# Patient Record
Sex: MALE | Race: WHITE | NOT HISPANIC OR LATINO | Employment: FULL TIME | URBAN - METROPOLITAN AREA
[De-identification: names, ages, dates, MRNs, and addresses within clinical notes are randomized per-mention and may not be internally consistent; named-entity substitution may affect disease eponyms.]

---

## 2017-03-28 DIAGNOSIS — Z01.812 PRE-PROCEDURAL LABORATORY EXAMINATION: ICD-10-CM

## 2017-03-28 LAB
ANION GAP SERPL CALC-SCNC: 10 MMOL/L (ref 0–11.9)
BUN SERPL-MCNC: 16 MG/DL (ref 8–22)
CALCIUM SERPL-MCNC: 9 MG/DL (ref 8.5–10.5)
CHLORIDE SERPL-SCNC: 105 MMOL/L (ref 96–112)
CO2 SERPL-SCNC: 26 MMOL/L (ref 20–33)
CREAT SERPL-MCNC: 1.05 MG/DL (ref 0.5–1.4)
ERYTHROCYTE [DISTWIDTH] IN BLOOD BY AUTOMATED COUNT: 38 FL (ref 35.9–50)
GFR SERPL CREATININE-BSD FRML MDRD: >60 ML/MIN/1.73 M 2
GLUCOSE SERPL-MCNC: 110 MG/DL (ref 65–99)
HCT VFR BLD AUTO: 45.3 % (ref 42–52)
HGB BLD-MCNC: 15.8 G/DL (ref 14–18)
MCH RBC QN AUTO: 31 PG (ref 27–33)
MCHC RBC AUTO-ENTMCNC: 34.9 G/DL (ref 33.7–35.3)
MCV RBC AUTO: 89 FL (ref 81.4–97.8)
PLATELET # BLD AUTO: 216 K/UL (ref 164–446)
PMV BLD AUTO: 9.2 FL (ref 9–12.9)
POTASSIUM SERPL-SCNC: 4.1 MMOL/L (ref 3.6–5.5)
RBC # BLD AUTO: 5.09 M/UL (ref 4.7–6.1)
SODIUM SERPL-SCNC: 141 MMOL/L (ref 135–145)
WBC # BLD AUTO: 4.9 K/UL (ref 4.8–10.8)

## 2017-03-28 PROCEDURE — 36415 COLL VENOUS BLD VENIPUNCTURE: CPT

## 2017-03-28 PROCEDURE — 80048 BASIC METABOLIC PNL TOTAL CA: CPT

## 2017-03-28 PROCEDURE — 85027 COMPLETE CBC AUTOMATED: CPT

## 2017-03-28 RX ORDER — IBUPROFEN 200 MG
200 TABLET ORAL PRN
COMMUNITY
End: 2018-05-02

## 2017-03-30 ENCOUNTER — APPOINTMENT (OUTPATIENT)
Dept: ADMISSIONS | Facility: MEDICAL CENTER | Age: 47
End: 2017-03-30
Payer: COMMERCIAL

## 2017-04-14 ENCOUNTER — HOSPITAL ENCOUNTER (OUTPATIENT)
Facility: MEDICAL CENTER | Age: 47
End: 2017-04-14
Attending: OTOLARYNGOLOGY | Admitting: OTOLARYNGOLOGY
Payer: COMMERCIAL

## 2017-04-14 VITALS
HEIGHT: 76 IN | RESPIRATION RATE: 16 BRPM | TEMPERATURE: 98.3 F | BODY MASS INDEX: 27.06 KG/M2 | HEART RATE: 88 BPM | OXYGEN SATURATION: 94 % | DIASTOLIC BLOOD PRESSURE: 105 MMHG | SYSTOLIC BLOOD PRESSURE: 177 MMHG | WEIGHT: 222.22 LBS

## 2017-04-14 PROBLEM — K13.79 ELONGATED UVULA, ACQUIRED: Status: ACTIVE | Noted: 2017-04-14

## 2017-04-14 PROBLEM — J35.1 HYPERTROPHY OF TONSILS: Status: ACTIVE | Noted: 2017-04-14

## 2017-04-14 PROCEDURE — 700102 HCHG RX REV CODE 250 W/ 637 OVERRIDE(OP)

## 2017-04-14 PROCEDURE — 502573 HCHG PACK, ENT: Performed by: OTOLARYNGOLOGY

## 2017-04-14 PROCEDURE — 500122 HCHG BOVIE, BLADE: Performed by: OTOLARYNGOLOGY

## 2017-04-14 PROCEDURE — 700101 HCHG RX REV CODE 250

## 2017-04-14 PROCEDURE — 160048 HCHG OR STATISTICAL LEVEL 1-5: Performed by: OTOLARYNGOLOGY

## 2017-04-14 PROCEDURE — 502626 HCHG SURGIFLO HEMOSTATIC MATRIX 6ML: Performed by: OTOLARYNGOLOGY

## 2017-04-14 PROCEDURE — 160036 HCHG PACU - EA ADDL 30 MINS PHASE I: Performed by: OTOLARYNGOLOGY

## 2017-04-14 PROCEDURE — 502240 HCHG MISC OR SUPPLY RC 0272: Performed by: OTOLARYNGOLOGY

## 2017-04-14 PROCEDURE — 110371 HCHG SHELL REV 272: Performed by: OTOLARYNGOLOGY

## 2017-04-14 PROCEDURE — 88304 TISSUE EXAM BY PATHOLOGIST: CPT

## 2017-04-14 PROCEDURE — 160009 HCHG ANES TIME/MIN: Performed by: OTOLARYNGOLOGY

## 2017-04-14 PROCEDURE — A4606 OXYGEN PROBE USED W OXIMETER: HCPCS | Performed by: OTOLARYNGOLOGY

## 2017-04-14 PROCEDURE — 500331 HCHG COTTONOID, SURG PATTIE: Performed by: OTOLARYNGOLOGY

## 2017-04-14 PROCEDURE — 160029 HCHG SURGERY MINUTES - 1ST 30 MINS LEVEL 4: Performed by: OTOLARYNGOLOGY

## 2017-04-14 PROCEDURE — 160002 HCHG RECOVERY MINUTES (STAT): Performed by: OTOLARYNGOLOGY

## 2017-04-14 PROCEDURE — 500123 HCHG BOVIE, CONTROL W/BLADE: Performed by: OTOLARYNGOLOGY

## 2017-04-14 PROCEDURE — 160035 HCHG PACU - 1ST 60 MINS PHASE I: Performed by: OTOLARYNGOLOGY

## 2017-04-14 PROCEDURE — 700111 HCHG RX REV CODE 636 W/ 250 OVERRIDE (IP)

## 2017-04-14 PROCEDURE — 500125 HCHG BOVIE, HANDLE: Performed by: OTOLARYNGOLOGY

## 2017-04-14 PROCEDURE — 110382 HCHG SHELL REV 271: Performed by: OTOLARYNGOLOGY

## 2017-04-14 PROCEDURE — 160041 HCHG SURGERY MINUTES - EA ADDL 1 MIN LEVEL 4: Performed by: OTOLARYNGOLOGY

## 2017-04-14 PROCEDURE — 500257: Performed by: OTOLARYNGOLOGY

## 2017-04-14 PROCEDURE — A9270 NON-COVERED ITEM OR SERVICE: HCPCS

## 2017-04-14 PROCEDURE — 501838 HCHG SUTURE GENERAL: Performed by: OTOLARYNGOLOGY

## 2017-04-14 RX ORDER — LABETALOL HYDROCHLORIDE 5 MG/ML
10 INJECTION, SOLUTION INTRAVENOUS PRN
Status: DISCONTINUED | OUTPATIENT
Start: 2017-04-14 | End: 2017-04-14 | Stop reason: HOSPADM

## 2017-04-14 RX ORDER — MORPHINE SULFATE 10 MG/ML
INJECTION, SOLUTION INTRAMUSCULAR; INTRAVENOUS
Status: COMPLETED
Start: 2017-04-14 | End: 2017-04-14

## 2017-04-14 RX ORDER — BACITRACIN ZINC 500 [USP'U]/G
OINTMENT TOPICAL
Status: DISCONTINUED
Start: 2017-04-14 | End: 2017-04-14 | Stop reason: HOSPADM

## 2017-04-14 RX ORDER — OXYMETAZOLINE HYDROCHLORIDE 0.05 G/100ML
SPRAY NASAL
Status: DISCONTINUED
Start: 2017-04-14 | End: 2017-04-14 | Stop reason: HOSPADM

## 2017-04-14 RX ORDER — BACITRACIN ZINC 500 [USP'U]/G
OINTMENT TOPICAL
Status: DISCONTINUED | OUTPATIENT
Start: 2017-04-14 | End: 2017-04-14 | Stop reason: HOSPADM

## 2017-04-14 RX ORDER — HYDRALAZINE HYDROCHLORIDE 20 MG/ML
INJECTION INTRAMUSCULAR; INTRAVENOUS
Status: COMPLETED
Start: 2017-04-14 | End: 2017-04-14

## 2017-04-14 RX ORDER — LIDOCAINE HYDROCHLORIDE AND EPINEPHRINE 10; 10 MG/ML; UG/ML
INJECTION, SOLUTION INFILTRATION; PERINEURAL
Status: DISCONTINUED | OUTPATIENT
Start: 2017-04-14 | End: 2017-04-14 | Stop reason: HOSPADM

## 2017-04-14 RX ORDER — SODIUM CHLORIDE, SODIUM LACTATE, POTASSIUM CHLORIDE, CALCIUM CHLORIDE 600; 310; 30; 20 MG/100ML; MG/100ML; MG/100ML; MG/100ML
INJECTION, SOLUTION INTRAVENOUS CONTINUOUS
Status: DISCONTINUED | OUTPATIENT
Start: 2017-04-14 | End: 2017-04-14 | Stop reason: HOSPADM

## 2017-04-14 RX ORDER — SODIUM CHLORIDE, SODIUM LACTATE, POTASSIUM CHLORIDE, CALCIUM CHLORIDE 600; 310; 30; 20 MG/100ML; MG/100ML; MG/100ML; MG/100ML
1000 INJECTION, SOLUTION INTRAVENOUS
Status: COMPLETED | OUTPATIENT
Start: 2017-04-14 | End: 2017-04-14

## 2017-04-14 RX ORDER — LIDOCAINE HYDROCHLORIDE AND EPINEPHRINE 10; 10 MG/ML; UG/ML
INJECTION, SOLUTION INFILTRATION; PERINEURAL
Status: DISCONTINUED
Start: 2017-04-14 | End: 2017-04-14 | Stop reason: HOSPADM

## 2017-04-14 RX ORDER — ONDANSETRON 8 MG/1
8 TABLET, ORALLY DISINTEGRATING ORAL EVERY 8 HOURS PRN
Qty: 10 TAB | Refills: 1 | Status: SHIPPED | OUTPATIENT
Start: 2017-04-14 | End: 2018-05-02

## 2017-04-14 RX ORDER — HALOPERIDOL 5 MG/ML
INJECTION INTRAMUSCULAR
Status: COMPLETED
Start: 2017-04-14 | End: 2017-04-14

## 2017-04-14 RX ORDER — OXYMETAZOLINE HYDROCHLORIDE 0.05 G/100ML
SPRAY NASAL
Status: COMPLETED
Start: 2017-04-14 | End: 2017-04-14

## 2017-04-14 RX ORDER — OXYCODONE HCL 5 MG/5 ML
SOLUTION, ORAL ORAL
Status: COMPLETED
Start: 2017-04-14 | End: 2017-04-14

## 2017-04-14 RX ORDER — AMOXICILLIN 250 MG/5ML
500 POWDER, FOR SUSPENSION ORAL 2 TIMES DAILY
Qty: 200 ML | Refills: 0 | Status: SHIPPED | OUTPATIENT
Start: 2017-04-14 | End: 2018-05-02

## 2017-04-14 RX ORDER — OXYMETAZOLINE HYDROCHLORIDE 0.05 G/100ML
SPRAY NASAL
Status: DISCONTINUED | OUTPATIENT
Start: 2017-04-14 | End: 2017-04-14 | Stop reason: HOSPADM

## 2017-04-14 RX ORDER — OXYCODONE HYDROCHLORIDE AND ACETAMINOPHEN 5; 325 MG/1; MG/1
1-2 TABLET ORAL EVERY 4 HOURS PRN
Qty: 80 TAB | Refills: 0 | Status: SHIPPED | OUTPATIENT
Start: 2017-04-14 | End: 2018-05-02

## 2017-04-14 RX ADMIN — OXYCODONE HYDROCHLORIDE 10 MG: 5 SOLUTION ORAL at 13:57

## 2017-04-14 RX ADMIN — MORPHINE SULFATE 2 MG: 10 INJECTION INTRAVENOUS at 15:15

## 2017-04-14 RX ADMIN — MORPHINE SULFATE 2 MG: 10 INJECTION INTRAVENOUS at 14:44

## 2017-04-14 RX ADMIN — FENTANYL CITRATE 25 MCG: 50 INJECTION, SOLUTION INTRAMUSCULAR; INTRAVENOUS at 14:13

## 2017-04-14 RX ADMIN — FENTANYL CITRATE 25 MCG: 50 INJECTION, SOLUTION INTRAMUSCULAR; INTRAVENOUS at 13:57

## 2017-04-14 RX ADMIN — HYDRALAZINE HYDROCHLORIDE 10 MG: 20 INJECTION INTRAMUSCULAR; INTRAVENOUS at 16:50

## 2017-04-14 RX ADMIN — MORPHINE SULFATE 2 MG: 10 INJECTION INTRAVENOUS at 14:55

## 2017-04-14 RX ADMIN — HALOPERIDOL LACTATE 1 MG: 5 INJECTION, SOLUTION INTRAMUSCULAR at 17:05

## 2017-04-14 RX ADMIN — SODIUM CHLORIDE, SODIUM LACTATE, POTASSIUM CHLORIDE, CALCIUM CHLORIDE 1000 ML: 600; 310; 30; 20 INJECTION, SOLUTION INTRAVENOUS at 11:00

## 2017-04-14 RX ADMIN — OXYMETAZOLINE HYDROCHLORIDE 2 SPRAY: 5 SPRAY NASAL at 12:15

## 2017-04-14 RX ADMIN — LABETALOL HYDROCHLORIDE 10 MG: 5 INJECTION, SOLUTION INTRAVENOUS at 15:00

## 2017-04-14 RX ADMIN — MORPHINE SULFATE 2 MG: 10 INJECTION INTRAVENOUS at 14:28

## 2017-04-14 RX ADMIN — FENTANYL CITRATE 50 MCG: 50 INJECTION, SOLUTION INTRAMUSCULAR; INTRAVENOUS at 14:04

## 2017-04-14 RX ADMIN — LABETALOL HYDROCHLORIDE 10 MG: 5 INJECTION, SOLUTION INTRAVENOUS at 14:50

## 2017-04-14 ASSESSMENT — PAIN SCALES - GENERAL
PAINLEVEL_OUTOF10: 7
PAINLEVEL_OUTOF10: 6
PAINLEVEL_OUTOF10: 4
PAINLEVEL_OUTOF10: 4
PAINLEVEL_OUTOF10: 6
PAINLEVEL_OUTOF10: 4
PAINLEVEL_OUTOF10: ASSUMED PAIN PRESENT
PAINLEVEL_OUTOF10: 4
PAINLEVEL_OUTOF10: 7
PAINLEVEL_OUTOF10: 4
PAINLEVEL_OUTOF10: 6
PAINLEVEL_OUTOF10: 4

## 2017-04-14 NOTE — IP AVS SNAPSHOT
4/14/2017    Aleksey Chavez  [address] [place] [address]    Dear Aleksey:    [facility] wants to ensure your discharge home is safe and you or your loved ones have had all of your questions answered regarding your care after you leave the hospital.    Below is a list of resources and contact information should you have any questions regarding your hospital stay, follow-up instructions, or active medical symptoms.    Questions or Concerns Regarding… Contact   Medical Questions Related to Your Discharge  (7 days a week, 8am-5pm) Contact a Nurse Care Coordinator   [phone]   Medical Questions Not Related to Your Discharge  (24 hours a day / 7 days a week)  Contact the Nurse Health Line   [phone]    Medications or Discharge Instructions Refer to your discharge packet   or contact your [facility] Primary Care Provider   [phone]   Follow-up Appointment(s) Schedule your appointment via [employer]   or contact Scheduling [phone]   Billing Review your statement via [employer]  or contact Billing [phone]   Medical Records Review your records via [employer]   or contact Medical Records [phone]     You may receive a telephone call within two days of discharge. This call is to make certain you understand your discharge instructions and have the opportunity to have any questions answered. You can also easily access your medical information, test results and upcoming appointments via the [employer] free online health management tool. You can learn more and sign up at [employer]Sync[employer]ak. For assistance setting up your S[employer]bak account, please call [phone].    Once again, we want to ensure your discharge home is safe and that you have a clear understanding of any next steps in your care. If you have any questions or concerns, please do not hesitate to contact us, we are here for you. Thank you for choosing [facility] for your healthcare needs.    Sincerely,    Your [facility] Healthcare Team

## 2017-04-14 NOTE — OR NURSING
1440 resumed care, cont to c/o pain, meds given, unable to reach  for ETD  1600  present  1700 c/o nausea, meds given,   1745 drip pad chgd x 3 for small amt fresh bleeding, pt expressing readiness to go home & that he has had very good care today, instructions given, iv d/c'd, home via w/c

## 2017-04-14 NOTE — OR SURGEON
Immediate Post-Operative Note      PreOp Diagnosis: septal deviation, bilateral inferior turbinate hypertrophy,tonsillitis, uvular edema    PostOp Diagnosis: same    Procedure(s):  SEPTOPLASTY - Wound Class: Clean Contaminated  TURBINOPLASTY - Wound Class: Clean Contaminated  UVULOPHARYNGOPALATOPLASTY FOR: UVULECTOMY   - Wound Class: Clean Contaminated  TONSILLECTOMY - Wound Class: Clean Contaminated    Surgeon(s):  Chaparro Ji M.D.    Anesthesiologist/Type of Anesthesia:  Anesthesiologist: Segundo Dougherty M.D./General    Surgical Staff:  Circulator: Stephanie Cavanaugh R.N.  Scrub Person: Laurie Perales    Specimen: tonsils    Estimated Blood Loss: 30 ml    Findings: 4+ tonsils    Complications: none        4/14/2017 2:10 PM Chaparro Ji

## 2017-04-14 NOTE — DISCHARGE INSTRUCTIONS
ACTIVITY: Rest and take it easy for the first 24 hours.  A responsible adult is recommended to remain with you during that time.  It is normal to feel sleepy.  We encourage you to not do anything that requires balance, judgment or coordination.    MILD FLU-LIKE SYMPTOMS ARE NORMAL. YOU MAY EXPERIENCE GENERALIZED MUSCLE ACHES, THROAT IRRITATION, HEADACHE AND/OR SOME NAUSEA.    FOR 24 HOURS DO NOT:  Drive, operate machinery or run household appliances.  Drink beer or alcoholic beverages.   Make important decisions or sign legal documents.    SPECIAL INSTRUCTIONS: *SEE POST OPERATIVE INSTRUCTION SHEET, SLEEP WITH HEAD ELEVATED, KEEP HYDRATED, NO NOSE BLOWING, HEAVY LIFTING, BENDING OR STRAINING, NO CITRUS PRODUCTS (LEMON, LIME, ORANGE, TOMATO, OR GRAPEFRUIT) **    DIET: To avoid nausea, slowly advance diet as tolerated, avoiding spicy or greasy foods for the first day.  Add more substantial food to your diet according to your physician's instructions.  Babies can be fed formula or breast milk as soon as they are hungry.  INCREASE FLUIDS AND FIBER TO AVOID CONSTIPATION.    SURGICAL DRESSING/BATHING: *CHANGE NASAL DRIP PAD AS NEEDED, MAY SHOWER TOMORROW, NO HOT SHOWERS, HOT TUBS, HOT BATHS OR SWIMMING FOR 2 WEEKS**    FOLLOW-UP APPOINTMENT:  A follow-up appointment should be arranged with your doctor in ; call to schedule.    You should CALL YOUR PHYSICIAN if you develop:  Fever greater than 101 degrees F.  Pain not relieved by medication, or persistent nausea or vomiting.  Excessive bleeding (blood soaking through dressing) or unexpected drainage from the wound.  Extreme redness or swelling around the incision site, drainage of pus or foul smelling drainage.  Inability to urinate or empty your bladder within 8 hours.  Problems with breathing or chest pain.    You should call 911 if you develop problems with breathing or chest pain.  If you are unable to contact your doctor or surgical center, you should go to the  nearest emergency room or urgent care center.  Physician's telephone #: *[phone]**    If any questions arise, call your doctor.  If your doctor is not available, please feel free to call the Surgical Center at [phone].  The Center is open Monday through Friday from 7AM to 7PM.  You can also call the HEALTH HOTLINE open 24 hours/day, 7 days/week and speak to a nurse at [phone], or toll free at [phone].    A registered nurse may call you a few days after your surgery to see how you are doing after your procedure.    MEDICATIONS: Resume taking daily medication.  Take prescribed pain medication with food.  If no medication is prescribed, you may take non-aspirin pain medication if needed.  PAIN MEDICATION CAN BE VERY CONSTIPATING.  Take a stool softener or laxative such as senokot, pericolace, or milk of magnesia if needed.    Prescription given for *PERCOCET, ZOFRAN, & AMOXICILLIN**.  Last pain medication given at *2:00PM, NEXT DUE AT 6:00PM**.    If your physician has prescribed pain medication that includes Acetaminophen (Tylenol), do not take additional Acetaminophen (Tylenol) while taking the prescribed medication.    Depression / Suicide Risk    As you are discharged from this [facility] Health facility, it is important to learn how to keep safe from harming yourself.    Recognize the warning signs:  · Abrupt changes in personality, positive or negative- including increase in energy   · Giving away possessions  · Change in eating patterns- significant weight changes-  positive or negative  · Change in sleeping patterns- unable to sleep or sleeping all the time   · Unwillingness or inability to communicate  · Depression  · Unusual sadness, discouragement and loneliness  · Talk of wanting to die  · Neglect of personal appearance   · Rebelliousness- reckless behavior  · Withdrawal from people/activities they love  · Confusion- inability to concentrate     If you or a loved one observes any of  these behaviors or has concerns about self-harm, here's what you can do:  · Talk about it- your feelings and reasons for harming yourself  · Remove any means that you might use to hurt yourself (examples: pills, rope, extension cords, firearm)  · Get professional help from the community (Mental Health, Substance Abuse, psychological counseling)  · Do not be alone:Call your Safe Contact- someone whom you trust who will be there for you.  · Call your local CRISIS HOTLINE 212-4317 or 926-721-5239  · Call your local Children's Mobile Crisis Response Team [place] (747) 888-8907 or www.[employer]  · Call the toll free National Suicide Prevention Hotlines   · National Suicide Prevention Lifeline 158-226-KDZB (1985)  · National Hope Line Network 800-SUICIDE (167-6992)

## 2017-04-14 NOTE — IP AVS SNAPSHOT
" Home Care Instructions                                                                                                                Name:Aleksey Chavez  Medical Record Number:[id number]  CSN: [id number]    YOB: 1970   Age: 46 y.o.  Sex: male  HT:1.93 m (6' 3.98\") WT: 100.8 kg (222 lb 3.6 oz)          Admit Date: 4/14/2017     Discharge Date:   Today's Date: 4/14/2017  Attending Doctor:  Chaparro Ji M.D.                  Allergies:  Review of patient's allergies indicates no known allergies.                Discharge Instructions         ACTIVITY: Rest and take it easy for the first 24 hours.  A responsible adult is recommended to remain with you during that time.  It is normal to feel sleepy.  We encourage you to not do anything that requires balance, judgment or coordination.    MILD FLU-LIKE SYMPTOMS ARE NORMAL. YOU MAY EXPERIENCE GENERALIZED MUSCLE ACHES, THROAT IRRITATION, HEADACHE AND/OR SOME NAUSEA.    FOR 24 HOURS DO NOT:  Drive, operate machinery or run household appliances.  Drink beer or alcoholic beverages.   Make important decisions or sign legal documents.    SPECIAL INSTRUCTIONS: *SEE POST OPERATIVE INSTRUCTION SHEET, SLEEP WITH HEAD ELEVATED, KEEP HYDRATED, NO NOSE BLOWING, HEAVY LIFTING, BENDING OR STRAINING, NO CITRUS PRODUCTS (LEMON, LIME, ORANGE, TOMATO, OR GRAPEFRUIT) **    DIET: To avoid nausea, slowly advance diet as tolerated, avoiding spicy or greasy foods for the first day.  Add more substantial food to your diet according to your physician's instructions.  Babies can be fed formula or breast milk as soon as they are hungry.  INCREASE FLUIDS AND FIBER TO AVOID CONSTIPATION.    SURGICAL DRESSING/BATHING: *CHANGE NASAL DRIP PAD AS NEEDED, MAY SHOWER TOMORROW, NO HOT SHOWERS, HOT TUBS, HOT BATHS OR SWIMMING FOR 2 WEEKS**    FOLLOW-UP APPOINTMENT:  A follow-up appointment should be arranged with your doctor in ; call to schedule.    You should CALL YOUR PHYSICIAN if you " develop:  Fever greater than 101 degrees F.  Pain not relieved by medication, or persistent nausea or vomiting.  Excessive bleeding (blood soaking through dressing) or unexpected drainage from the wound.  Extreme redness or swelling around the incision site, drainage of pus or foul smelling drainage.  Inability to urinate or empty your bladder within 8 hours.  Problems with breathing or chest pain.    You should call 911 if you develop problems with breathing or chest pain.  If you are unable to contact your doctor or surgical center, you should go to the nearest emergency room or urgent care center.  Physician's telephone #: *[phone]**    If any questions arise, call your doctor.  If your doctor is not available, please feel free to call the Surgical Center at [phone].  The Center is open Monday through Friday from 7AM to 7PM.  You can also call the [employer] HOTLINE open 24 hours/day, 7 days/week and speak to a nurse at [phone], or toll free at [phone].    A registered nurse may call you a few days after your surgery to see how you are doing after your procedure.    MEDICATIONS: Resume taking daily medication.  Take prescribed pain medication with food.  If no medication is prescribed, you may take non-aspirin pain medication if needed.  PAIN MEDICATION CAN BE VERY CONSTIPATING.  Take a stool softener or laxative such as senokot, pericolace, or milk of magnesia if needed.    Prescription given for *PERCOCET, ZOFRAN, & AMOXICILLIN**.  Last pain medication given at *2:00PM, NEXT DUE AT 6:00PM**.    If your physician has prescribed pain medication that includes Acetaminophen (Tylenol), do not take additional Acetaminophen (Tylenol) while taking the prescribed medication.    Depression / Suicide Risk    As you are discharged from this [facility] facility, it is important to learn how to keep safe from harming yourself.    Recognize the warning signs:  · Abrupt changes in personality, positive  or negative- including increase in energy   · Giving away possessions  · Change in eating patterns- significant weight changes-  positive or negative  · Change in sleeping patterns- unable to sleep or sleeping all the time   · Unwillingness or inability to communicate  · Depression  · Unusual sadness, discouragement and loneliness  · Talk of wanting to die  · Neglect of personal appearance   · Rebelliousness- reckless behavior  · Withdrawal from people/activities they love  · Confusion- inability to concentrate     If you or a loved one observes any of these behaviors or has concerns about self-harm, here's what you can do:  · Talk about it- your feelings and reasons for harming yourself  · Remove any means that you might use to hurt yourself (examples: pills, rope, extension cords, firearm)  · Get professional help from the community (Mental Health, Substance Abuse, psychological counseling)  · Do not be alone:Call your Safe Contact- someone whom you trust who will be there for you.  · Call your local CRISIS HOTLINE 043-4897 or 911-073-5183  · Call your local Children's Mobile Crisis Response Team [place] (195) 591-0285 or www[employer]  · Call the toll free National Suicide Prevention Hotlines   · National Suicide Prevention Lifeline 899-619-CQUM (2304)  · National Hope Line Network 800-SUICIDE (028-1621)       Medication List      START taking these medications        Instructions    Morning Afternoon Evening Bedtime    amoxicillin 250 MG/5ML Susr   Commonly known as:  AMOXIL        Take 10 mL by mouth 2 times a day.   Dose:  500 mg                        ondansetron 8 MG Tbdp   Commonly known as:  ZOFRAN ODT        Take 1 Tab by mouth every 8 hours as needed for Nausea/Vomiting.   Dose:  8 mg                        oxycodone-acetaminophen 5-325 MG Tabs   Commonly known as:  PERCOCET        Take 1-2 Tabs by mouth every four hours as needed.   Dose:  1-2 Tab                          ASK your doctor  about these medications        Instructions    Morning Afternoon Evening Bedtime    ibuprofen 200 MG Tabs   Commonly known as:  MOTRIN        Take 200 mg by mouth as needed.   Dose:  200 mg                             Where to Get Your Medications      You can get these medications from any pharmacy     Bring a paper prescription for each of these medications    - amoxicillin 250 MG/5ML Susr  - ondansetron 8 MG Tbdp  - oxycodone-acetaminophen 5-325 MG Tabs            Medication Information     Above and/or attached are the medications your physician expects you to take upon discharge. Review all of your home medications and newly ordered medications with your doctor and/or pharmacist. Follow medication instructions as directed by your doctor and/or pharmacist. Please keep your medication list with you and share with your physician. Update the information when medications are discontinued, doses are changed, or new medications (including over-the-counter products) are added; and carry medication information at all times in the event of emergency situations.        Resources     Quit Smoking / Tobacco Use:    I understand the use of any tobacco products increases my chance of suffering from future heart disease or stroke and could cause other illnesses which may shorten my life. Quitting the use of tobacco products is the single most important thing I can do to improve my health. For further information on smoking / tobacco cessation call a Toll Free Quit Line at 1-908.758.7456 (*National Cancer Vass) or 1-822.664.1775 (American Lung Association) or you can access the web based program at www.lungusa.org.    [place] Tobacco Users Help Line:  (129) 383-5473       Toll Free: 1-891.926.9315  Quit Tobacco Program [facility] (755)196-7642    Crisis Hotline:    [place] Crisis Hotline:  6-048-PJYHGUV or 1-968.293.9747    [place] Crisis Hotline:    1-379.300.8428 or 351-256-8391    Discharge Survey:    Thank you for choosing [facility]. We hope we did everything we could to make your hospital stay a pleasant one. You may be receiving a survey and we would appreciate your time and participation in answering the questions. Your input is very valuable to us in our efforts to improve our service to our patients and their families.            Signatures     My signature on this form indicates that:    1. I acknowledge receipt and understanding of these Home Care Instruction.  2. My questions regarding this information have been answered to my satisfaction.  3. I have formulated a plan with my discharge nurse to obtain my prescribed medications for home.    __________________________________      __________________________________                   Patient Signature                                 Guardian/Responsible Adult Signature      __________________________________                 __________       ________                       Nurse Signature                                               Date                 Time

## 2017-04-14 NOTE — OR NURSING
Rec'd pt from OR with Dr. Dougherty, no airway present, vss, no distress noted, breathing is even and unlabored, hob elevated, pt placed on humidified O2, nasal gauze/sling placed, minimal sanguinous fluid present,   [id number] reported off to Philomena CORTEZ

## 2017-04-14 NOTE — OR NURSING
1355: care resumed, pt c/o 7/10 pain in throat, given sips of water, tolerating well,   1400: pt medicated with iv fent and oral pain meds,   1405: pt given another dose of iv fent,   1413: another dose of iv fent given, pain remains unchanged,   1420: Pain starting to ease, still 6/10, pt given a popsicle, nasal dressing changed,   1428: pt medicated with iv morphine,   1440: hand-off to Eda CORTEZ.

## 2017-04-17 NOTE — OP REPORT
DATE OF SERVICE:  04/14/2017    PREOPERATIVE DIAGNOSES:  Nasal septal deviation, bilateral inferior turbinate   hypertrophy, persistent tonsillitis and uvular edema.    POSTOPERATIVE DIAGNOSES:  Nasal septal deviation, bilateral inferior turbinate   hypertrophy, persistent tonsillitis and uvular edema.    PROCEDURES PERFORMED:  Nasal septoplasty with bilateral submucous resection of   the inferior turbinates, tonsillectomy and uvulectomy.    SURGEON:  Chaparro Ji MD    ANESTHESIA:  General endotracheal.    INDICATIONS:  The patient is a very nice 46-year-old male with nasal   obstruction and tonsillitis with obstruction and uvular edema.  Correction is   indicated.    DESCRIPTION OF PROCEDURE:  The patient was placed on the operating room table   in supine position.  After adequate general endotracheal anesthesia was   administered, the right and left nasal septal mucosa and right and left   inferior turbinates were anesthetized with 1% lidocaine with 1:100,000   epinephrine using approximately 10 mL.  Afrin-soaked pledgets were placed in   the nose bilaterally and the face was draped in a clean fashion.  A   hemitransfixion incision was performed on the left with a #15 blade and a   submucoperichondrial and mucoperiosteal flap was raised with a caudal   elevator.  Anterior to the septal deflection, the septal cartilage was incised   and an opposite-sided submucoperichondrial and mucoperiosteal flap was raised   with a caudal elevator.  The deviated portion of nasal septal cartilage and   bone were removed with a combination of a Melissa forceps and a V-chisel.    Once the septum was reduced to the midline, the hemitransfixion incision was   closed with a 4-0 Vicryl in an interrupted fashion.  The right and left   inferior turbinates were trimmed in the submucous fashion using straight and   curved turbinate scissors under direct visualization with a 4 mm, 0-degree   Storz endoscope.  Hemostasis was acquired  by using suction electrocautery.    Surgiflo was layered over the inferior turbinates bilaterally and bacitracin   ointment-soaked Lomax splints were placed in the right and left nares and   secured anteriorly to the columella with a 3-0 nylon suture.  The table was   turned.  A McIvor mouth gag was applied, the tongue was retracted inferiorly   and the McIvor was gently suspended from the Higgins stand.  The right and left   tonsils were removed from superior pole to inferior pole using Bovie   electrocautery in its entirety in a subcapsular fashion.  The inferior,   middle, and superior pole vessels were further cauterized with suction   electrocautery.  Approximately 3 or 4 mm of soft palate and uvula were removed   with a right angle clamp, right angle scissor and control of the uvular   artery was obtained with suction electrocautery.  The posterior soft palate   mucosa was closed to the anterior soft palate mucosa with a 3-0 Vicryl in an   interrupted fashion.  Copious saline irrigation of the oral cavity was then   performed.  There was no further identifiable bleeding at the termination of   the procedure.  The patient was extubated in the operating room, brought to   the recovery room in satisfactory condition.  There were no intraoperative   complications.       ____________________________________     MD NATALIE KRAUS / SCARLETT    DD:  04/16/2017 13:28:30  DT:  04/16/2017 17:40:03    D#:  [id number]  Job#:  [id number]

## 2017-10-26 ENCOUNTER — OFFICE VISIT (OUTPATIENT)
Dept: DERMATOLOGY | Facility: IMAGING CENTER | Age: 47
End: 2017-10-26
Payer: COMMERCIAL

## 2017-10-26 ENCOUNTER — HOSPITAL ENCOUNTER (OUTPATIENT)
Facility: MEDICAL CENTER | Age: 47
End: 2017-10-26
Attending: DERMATOLOGY
Payer: COMMERCIAL

## 2017-10-26 VITALS
HEART RATE: 66 BPM | RESPIRATION RATE: 12 BRPM | HEIGHT: 76 IN | BODY MASS INDEX: 25.57 KG/M2 | DIASTOLIC BLOOD PRESSURE: 70 MMHG | WEIGHT: 210 LBS | SYSTOLIC BLOOD PRESSURE: 118 MMHG | TEMPERATURE: 98.6 F

## 2017-10-26 DIAGNOSIS — Z87.2: ICD-10-CM

## 2017-10-26 DIAGNOSIS — L81.4 LENTIGINES: ICD-10-CM

## 2017-10-26 DIAGNOSIS — D48.5 NEOPLASM OF UNCERTAIN BEHAVIOR OF SKIN: ICD-10-CM

## 2017-10-26 PROCEDURE — 99202 OFFICE O/P NEW SF 15 MIN: CPT | Performed by: DERMATOLOGY

## 2017-10-26 PROCEDURE — 11100 PR BIOPSY OF SKIN LESION: CPT | Performed by: DERMATOLOGY

## 2017-10-26 PROCEDURE — 88305 TISSUE EXAM BY PATHOLOGIST: CPT

## 2017-10-26 ASSESSMENT — ENCOUNTER SYMPTOMS
FEVER: 0
CHILLS: 0
WEIGHT LOSS: 0

## 2017-10-26 NOTE — PROGRESS NOTES
"Dermatology New Patient Visit    Chief Complaint   Patient presents with   • Other     skin lesion        Subjective:     HPI:   Aleksey Chavez is a 47 y.o. male presenting for    Skin lesion on right side of neck  Has grown in size   No pain, itching, bleeding  Has not received any treatments  History of skin cancer: No  History of biopsies:Yes, Details: 2008  , shoulder negative   History of blistering/severe sunburns:Yes, Details: 7 months ago   Family history of skin cancer:No  Family history of atypical moles:No    Alopecia areata  - right scalp, central chest  - scalp lesion was 10+ years ago, central chest this year  - hair growing back over both areas  - no pain, itching, redness  No h/o thyroid problems/ heat/cold intolerance, significant weight changes, fatigue, hair changes, additional skin problems         Past Medical History:   Diagnosis Date   • Deviated nasal septum 3-28-17   • Hypertrophy of nasal turbinates 3-28-17   • Hypertrophy of tonsils 3-28-17   • Snoring 3-28-17    Has not had a sleep study   • Tuberculosis     Pt. states has a \"yearly CXR at [facility] in [place] which is negative\". \"Last CXR done on \".       Current Outpatient Prescriptions on File Prior to Visit   Medication Sig Dispense Refill   • ondansetron (ZOFRAN ODT) 8 MG TABLET DISPERSIBLE Take 1 Tab by mouth every 8 hours as needed for Nausea/Vomiting. 10 Tab 1   • oxycodone-acetaminophen (PERCOCET) 5-325 MG Tab Take 1-2 Tabs by mouth every four hours as needed. 80 Tab 0   • amoxicillin (AMOXIL) 250 MG/5ML Recon Susp Take 10 mL by mouth 2 times a day. 200 mL 0   • ibuprofen (MOTRIN) 200 MG Tab Take 200 mg by mouth as needed.       No current facility-administered medications on file prior to visit.        No Known Allergies    No family history on file.    Social History     Social History   • Marital status:      Spouse name: N/A   • Number of children: N/A   • Years of education: N/A     Occupational History " "  • Not on file.     Social History Main Topics   • Smoking status: Never Smoker   • Smokeless tobacco: Not on file   • Alcohol use Yes      Comment: \"3-4- per week\"   • Drug use: No   • Sexual activity: Not on file     Other Topics Concern   • Not on file     Social History Narrative   • No narrative on file       Review of Systems   Constitutional: Negative for chills, fever and weight loss.   Skin: Negative for itching and rash.   All other systems reviewed and are negative.       Objective:     A focused cutaneous exam was completed including: hair, ears, face, eyelids, conjunctiva, lips, neck, upper chest, back, left and right upper extremities with the following pertinent findings listed below. Remaining above-listed examined areas within normal limits / negative for rashes or lesions.    Blood pressure 118/70, pulse 66, temperature 37 °C (98.6 °F), resp. rate 12, height 1.93 m (6' 4\"), weight 95.3 kg (210 lb).    Physical Exam   Constitutional: He is oriented to person, place, and time and well-developed, well-nourished, and in no distress.   HENT:   Head: Normocephalic and atraumatic.       Right Ear: External ear normal.   Left Ear: External ear normal.   Nose: Nose normal.   Eyes: Conjunctivae are normal.   Neck: Normal range of motion.   Pulmonary/Chest: Effort normal.   Neurological: He is alert and oriented to person, place, and time.   Skin: Skin is warm and dry.        Declines exam of the chest   Psychiatric: Mood and affect normal.       DATA: none applicable to review    Assessment and Plan:     1. Neoplasm of uncertain behavior of skin - right postauricular neck  Procedure Note   Procedure: Biopsy by shave technique  Location: as noted above  Size: as noted in exam  Preoperative diagnosis: atypical nevus, r/o melanoma vs less likely inflamed early SK  Risks, benefits and alternatives of procedure discussed and written informed consent obtained. Time out completed. Area of biopsy prepped with " alcohol. Anesthesia with 1% lidocaine with epinephrine administered with 30 gauge needle. Shave biopsy of the site performed. Hemostasis achieved with pressure and aluminum chloride. Vaseline applied to wound with bandage. Patient tolerated procedure well and there were no complications. The specimen was sent to the pathology lab by the staff. Wound care was discussed.    2. Lentigines -trunk, shoulders  - Benign-appearing nature of lesions discussed. Advised to return to clinic for any new or concerning changes.    3. History of alopecia areata - not active issue  - discussed other associated diseases   - rtc if becomes bothersome    Followup: Return in about 6 weeks (around 12/7/2017) for spot check, right neck.    Brenda Abarca M.D.

## 2017-10-30 ENCOUNTER — TELEPHONE (OUTPATIENT)
Dept: DERMATOLOGY | Facility: IMAGING CENTER | Age: 47
End: 2017-10-30

## 2017-10-30 NOTE — TELEPHONE ENCOUNTER
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 Further management was discussed, including Mohs vs wide local excision vs curettage & electrodesiccation (C&ED). Patient opted for C&ED. He will call back tomorrow to schedule appointment

## 2017-11-08 ENCOUNTER — OFFICE VISIT (OUTPATIENT)
Dept: DERMATOLOGY | Facility: IMAGING CENTER | Age: 47
End: 2017-11-08
Payer: COMMERCIAL

## 2017-11-08 VITALS
WEIGHT: 215 LBS | SYSTOLIC BLOOD PRESSURE: 120 MMHG | DIASTOLIC BLOOD PRESSURE: 76 MMHG | BODY MASS INDEX: 26.18 KG/M2 | HEIGHT: 76 IN | TEMPERATURE: 97.8 F

## 2017-11-08 DIAGNOSIS — C44.41 BASAL CELL CARCINOMA OF SKIN OF NECK: ICD-10-CM

## 2017-11-08 PROCEDURE — 17271 DSTR MAL LES S/N/H/F/G 0.6-1: CPT | Performed by: DERMATOLOGY

## 2017-11-08 NOTE — PROGRESS NOTES
"PROCEDURE NOTE:    CURETTAGE &  ELECTRODESICCATION    After patient received diagnosis of basal cell carcinoma, superficial type, further management was discussed, including Mohs vs wide local excision vs curettage & electrodesiccation (C&ED) vs topical creams vs cryotherapy. Patient opted for ED&C. Risks, benefits and alternatives of procedure, including, but not limited to scar, bleeding, pain, infection, recurrence and need for further surgery, were discussed and written informed consent obtained. Verbal time out completed.     Allergies reviewed: Yes  Pacemaker/defibrillator: No  Artificial joints: No  Antibiotics given: No    Pre-op diagnosis: BCC, superficial (requisition#: [id number])  Post-op diagnosis: Same  Site: right neck  Pre-op size: 7x3mm    Blood pressure 120/76, temperature 36.6 °C (97.8 °F), height 1.93 m (6' 4\"), weight 97.5 kg (215 lb).    Procedure: Area of biopsy prepped with alcohol, marked with sterile marking pen. Anesthesia with 1% lidocaine with epinephrine administered with 30 gauge needle. The area was again cleaned with povidine-iodine swab. The site was debulked with a sharp, 5mm, then a 3mm curette, and scraped in 3 different directions. The curettaged area was then electrodesiccated for hemostasis (hyfrecator). This entire cycle was repeated three times, and hemostasis was achieved. Vaseline applied to wound with bandage. Patient tolerated procedure well and there were no complications, blood loss was minimal.     Final wound size: 10x5mm    Wound care was discussed with the patient, and written instructions were provided. Patient to return to clinic in 3 months for wound check and total skin exam. Patient to call us if any problems or concerns with the procedure site arise prior to scheduled appointment.    Brenda Abarca M.D.        "

## 2018-02-06 ENCOUNTER — OFFICE VISIT (OUTPATIENT)
Dept: DERMATOLOGY | Facility: IMAGING CENTER | Age: 48
End: 2018-02-06
Payer: COMMERCIAL

## 2018-02-06 DIAGNOSIS — L73.8 SEBACEOUS HYPERPLASIA: ICD-10-CM

## 2018-02-06 DIAGNOSIS — Z85.828 HISTORY OF BASAL CELL CARCINOMA: ICD-10-CM

## 2018-02-06 DIAGNOSIS — L91.8 ACROCHORDON: ICD-10-CM

## 2018-02-06 DIAGNOSIS — L63.9 ALOPECIA AREATA: ICD-10-CM

## 2018-02-06 PROCEDURE — 99213 OFFICE O/P EST LOW 20 MIN: CPT | Performed by: DERMATOLOGY

## 2018-02-06 ASSESSMENT — ENCOUNTER SYMPTOMS
WEIGHT LOSS: 0
FEVER: 0
CHILLS: 0

## 2018-02-06 NOTE — PROGRESS NOTES
"Dermatology Return Patient Visit    Chief Complaint   Patient presents with   • Follow-Up       Subjective:     HPI:   Aleksey Chavez is a 47 y.o. male presenting for    Here for full skin exam - last visit had BCC on right neck treated with C&D - 11/8/18  Area well healed  Notes two spots on the face  Appeared a few years ago  Have not grown  No itching/bleeding/pain  No treatments    Tag on the right side of the body   Appeared over a year ago  Slight increase in size  No treatments, no symptoms    Patch of hair loss on the chest  Appeared a few months ago  No itching, pain, redness  States hair already started growing back   Had a similar spot on the scalp 10 years ago  ?unsure if he received treatment  No h/o thyroid problems/ heat/cold intolerance, significant weight changes, fatigue, hair changes, additional skin problems     History of skin cancer: Yes, BCC fall 2017  History of biopsies:Yes, Details: 2008  , shoulder negative   History of blistering/severe sunburns:Yes, Details: 7 months ago   Family history of skin cancer:No  Family history of atypical moles:No        Past Medical History:   Diagnosis Date   • Deviated nasal septum 3-28-17   • Hypertrophy of nasal turbinates 3-28-17   • Hypertrophy of tonsils 3-28-17   • Snoring 3-28-17    Has not had a sleep study   • Tuberculosis     Pt. states has a \"yearly CXR at [facility] in [place] which is negative\". \"Last CXR done on \".       Current Outpatient Prescriptions on File Prior to Visit   Medication Sig Dispense Refill   • ondansetron (ZOFRAN ODT) 8 MG TABLET DISPERSIBLE Take 1 Tab by mouth every 8 hours as needed for Nausea/Vomiting. 10 Tab 1   • oxycodone-acetaminophen (PERCOCET) 5-325 MG Tab Take 1-2 Tabs by mouth every four hours as needed. 80 Tab 0   • amoxicillin (AMOXIL) 250 MG/5ML Recon Susp Take 10 mL by mouth 2 times a day. 200 mL 0   • ibuprofen (MOTRIN) 200 MG Tab Take 200 mg by mouth as needed.       No current " "facility-administered medications on file prior to visit.        No Known Allergies    No family history on file.    Social History     Social History   • Marital status:      Spouse name: N/A   • Number of children: N/A   • Years of education: N/A     Occupational History   • Not on file.     Social History Main Topics   • Smoking status: Never Smoker   • Smokeless tobacco: Not on file   • Alcohol use Yes      Comment: \"3-4- per week\"   • Drug use: No   • Sexual activity: Not on file     Other Topics Concern   • Not on file     Social History Narrative   • No narrative on file       Review of Systems   Constitutional: Negative for chills, fever and weight loss.   Skin: Negative for itching and rash.   All other systems reviewed and are negative.       Objective:     A full mucocutaneous exam was completed including: hair, ears, face, eyelids, conjunctiva, lips, neck, upper chest, back, left and right upper extremities (including hands/fingernails), lower extremities (including feet/toenails), buttocks, excluding external genitalia (patient refusal) with the following pertinent findings listed below. Remaining above-listed examined areas within normal limits / negative for rashes or lesions.    Physical Exam   Constitutional: He is oriented to person, place, and time and well-developed, well-nourished, and in no distress.   HENT:   Head: Normocephalic and atraumatic.       Right Ear: External ear normal.   Left Ear: External ear normal.   Nose: Nose normal.   Mouth/Throat: Oropharynx is clear and moist.   Eyes: Conjunctivae and lids are normal.   Neck: Normal range of motion. Neck supple.   Cardiovascular: Intact distal pulses.    Pulmonary/Chest: Effort normal.   Neurological: He is alert and oriented to person, place, and time.   Skin: Skin is warm and dry.        Declines exam of the chest   Psychiatric: Mood and affect normal.   Vitals reviewed.      DATA: none applicable to review    Assessment and Plan: "     1. History of basal cell carcinoma  - Skin cancer education  - discussed importance of sun protective clothing, eyewear  - discussed importance of daily use of broad spectrum sunscreen with SPF 30 or greater, as well as need for reapplication ~every 2 hours when exposed to UVR  - discussed importance of regular self-exams, ideally once per month, annual exams in clinic  - ABCDE's of melanoma discussed  - patient to bring any new or concerning lesions to my attention    2. Acrochordons - face, underarm  - Benign-appearing nature of lesions discussed. Advised to return to clinic for any new or concerning changes.    3. Sebaceous hyperplasia  - Benign-appearing nature of lesion discussed. Advised to return to clinic for any new or concerning changes.    4. Alopecia areata  - educated patient about diagnosis, management options, and expectations of treatment  - discussed associated risk factors (thyroid disease, vitiligo, etc)  - discussed that this issue can often resolve on it's own, and no intervention is a reasonable management plan, and patient wishes to hold off on any treatment    Followup: Return in about 6 months (around 8/6/2018) for darwin.    Brenda Abarca M.D.

## 2018-02-06 NOTE — NON-PROVIDER
Under right eyelid skin colored papule sebaceous hyperplasia    Multiple medium and dark brown macules scattered over the trunk >> extremities  Patch of decreased density on chest

## 2018-05-02 ENCOUNTER — APPOINTMENT (OUTPATIENT)
Dept: ADMISSIONS | Facility: MEDICAL CENTER | Age: 48
End: 2018-05-02
Attending: COLON & RECTAL SURGERY
Payer: COMMERCIAL

## 2018-05-09 ENCOUNTER — HOSPITAL ENCOUNTER (OUTPATIENT)
Facility: MEDICAL CENTER | Age: 48
End: 2018-05-09
Attending: COLON & RECTAL SURGERY | Admitting: COLON & RECTAL SURGERY
Payer: COMMERCIAL

## 2018-05-09 VITALS
TEMPERATURE: 97.8 F | RESPIRATION RATE: 17 BRPM | OXYGEN SATURATION: 97 % | HEART RATE: 74 BPM | HEIGHT: 76 IN | WEIGHT: 214.51 LBS | DIASTOLIC BLOOD PRESSURE: 92 MMHG | BODY MASS INDEX: 26.12 KG/M2 | SYSTOLIC BLOOD PRESSURE: 130 MMHG

## 2018-05-09 DIAGNOSIS — G89.18 POSTOPERATIVE PAIN: ICD-10-CM

## 2018-05-09 DIAGNOSIS — K64.3 COMPLEX FOURTH DEGREE HEMORRHOID: ICD-10-CM

## 2018-05-09 PROCEDURE — A9270 NON-COVERED ITEM OR SERVICE: HCPCS

## 2018-05-09 PROCEDURE — 160025 RECOVERY II MINUTES (STATS): Performed by: COLON & RECTAL SURGERY

## 2018-05-09 PROCEDURE — 160046 HCHG PACU - 1ST 60 MINS PHASE II: Performed by: COLON & RECTAL SURGERY

## 2018-05-09 PROCEDURE — 160028 HCHG SURGERY MINUTES - 1ST 30 MINS LEVEL 3: Performed by: COLON & RECTAL SURGERY

## 2018-05-09 PROCEDURE — 160009 HCHG ANES TIME/MIN: Performed by: COLON & RECTAL SURGERY

## 2018-05-09 PROCEDURE — 700102 HCHG RX REV CODE 250 W/ 637 OVERRIDE(OP)

## 2018-05-09 PROCEDURE — 700101 HCHG RX REV CODE 250

## 2018-05-09 PROCEDURE — A6403 STERILE GAUZE>16 <= 48 SQ IN: HCPCS | Performed by: COLON & RECTAL SURGERY

## 2018-05-09 PROCEDURE — 700111 HCHG RX REV CODE 636 W/ 250 OVERRIDE (IP)

## 2018-05-09 PROCEDURE — 88304 TISSUE EXAM BY PATHOLOGIST: CPT

## 2018-05-09 PROCEDURE — 160002 HCHG RECOVERY MINUTES (STAT): Performed by: COLON & RECTAL SURGERY

## 2018-05-09 PROCEDURE — 160048 HCHG OR STATISTICAL LEVEL 1-5: Performed by: COLON & RECTAL SURGERY

## 2018-05-09 PROCEDURE — 160035 HCHG PACU - 1ST 60 MINS PHASE I: Performed by: COLON & RECTAL SURGERY

## 2018-05-09 PROCEDURE — 160039 HCHG SURGERY MINUTES - EA ADDL 1 MIN LEVEL 3: Performed by: COLON & RECTAL SURGERY

## 2018-05-09 PROCEDURE — 501838 HCHG SUTURE GENERAL: Performed by: COLON & RECTAL SURGERY

## 2018-05-09 RX ORDER — SODIUM CHLORIDE, SODIUM LACTATE, POTASSIUM CHLORIDE, CALCIUM CHLORIDE 600; 310; 30; 20 MG/100ML; MG/100ML; MG/100ML; MG/100ML
INJECTION, SOLUTION INTRAVENOUS CONTINUOUS
Status: DISCONTINUED | OUTPATIENT
Start: 2018-05-09 | End: 2018-05-09 | Stop reason: HOSPADM

## 2018-05-09 RX ORDER — OXYCODONE HCL 5 MG/5 ML
SOLUTION, ORAL ORAL
Status: DISCONTINUED
Start: 2018-05-09 | End: 2018-05-09 | Stop reason: HOSPADM

## 2018-05-09 RX ORDER — BUPIVACAINE HYDROCHLORIDE AND EPINEPHRINE 5; 5 MG/ML; UG/ML
INJECTION, SOLUTION EPIDURAL; INTRACAUDAL; PERINEURAL
Status: DISCONTINUED | OUTPATIENT
Start: 2018-05-09 | End: 2018-05-09 | Stop reason: HOSPADM

## 2018-05-09 RX ORDER — OXYCODONE AND ACETAMINOPHEN 7.5; 325 MG/1; MG/1
1 TABLET ORAL EVERY 4 HOURS PRN
Qty: 40 TAB | Refills: 0 | Status: SHIPPED | OUTPATIENT
Start: 2018-05-09 | End: 2018-05-16

## 2018-05-09 RX ORDER — LANOLIN AND PETROLATUM 136.4; 469.9 MG/G; MG/G
OINTMENT TOPICAL
Status: DISCONTINUED | OUTPATIENT
Start: 2018-05-09 | End: 2018-05-09 | Stop reason: HOSPADM

## 2018-05-09 RX ADMIN — SODIUM CHLORIDE, SODIUM LACTATE, POTASSIUM CHLORIDE, CALCIUM CHLORIDE: 600; 310; 30; 20 INJECTION, SOLUTION INTRAVENOUS at 06:51

## 2018-05-09 ASSESSMENT — PAIN SCALES - GENERAL
PAINLEVEL_OUTOF10: 4
PAINLEVEL_OUTOF10: 4
PAINLEVEL_OUTOF10: 0
PAINLEVEL_OUTOF10: 4
PAINLEVEL_OUTOF10: 0
PAINLEVEL_OUTOF10: 4
PAINLEVEL_OUTOF10: 4

## 2018-05-09 NOTE — PROGRESS NOTES
Pt verbalizes readiness for discharge. VSS, pain controlled, no N/V. Discharge instructions reviewed with pt and his wife both verbalized understanding and will follow up as instructed. No further needs at this time.

## 2018-05-09 NOTE — OP REPORT
NAME:  Aleksey Chavez  MRN:  [id number]  :  1970    DATE OF OPERATION: 2018    PREOPERATIVE DIAGNOSIS: Advanced internal and external grade IV symptomatic   hemorrhoids.     POSTOPERATIVE DIAGNOSES: Advanced internal and external grade IV hemorrhoids.     OPERATION PERFORMED:   1. Examination under anesthesia.   2. Proctoscopy.   3. Hemorrhoidectomy, internal and external, complex.     SURGEON: Bi Paul MD    ANESTHESIOLOGIST:  Javier Kumari MD., MD    ANESTHESIA: General endotracheal anesthesia.     SPECIMEN: hemorrhoidectomy    ESTIMATED BLOOD LOSS: <10cc.     INDICATIONS FOR PROCEDURE: The patient is a 47 y.o. male with pain,  swelling, bleeding large internal and external  hemorrhoids. He is taken to the operating room today for hemorrhoidectomy.     DETAILS OF PROCEDURE: After an extensive informed consent discussion and process, the patient was brought to the operating room. She was placed in a supine position on the operating table. After induction of general anesthesia, she was repositioned into lithotomy with the Yellofin stirrups, sequential compression stockings and appropriate padding. The anorectal region was prepped and draped in the usual sterile fashion.     After administration of intravenous antibiotics, a careful examination under anesthesia was performed. This demonstrated large, obvious hemorrhoids in all 3 major quadrants, one very large left posterior internal and external complex and others that were purely internal. Proctoscopy and Hill-Domínguez anoscopy demonstrated some lax rectal mucosa and the continuation of the internal hemorrhoidal disease into the anal canal but no other lesions. There was no proctitis or mass. Bupivacaine with epinephrine was infiltrated in the perianal tissues and subcutaneous tissues to establish a block. We began with the large hemorrhoid complex. This hemorrhoid complex was excised with care taken to preserve the anoderm as much as possible.  The column was then dissected high into the anal canal with care taken to sweep outward and carefully preserve all the  fibers of the internal anal sphincter mechanism. At its proximal extent, the vessels were coagulated, and the base was suture ligated with chromic suture.     The specimen was excised and passed off the field for pathology. The defect was then run closed with chromic suture. The internal only hemorrhoids in other sectors were treated with oversew imbrication.  Additional   bupivacaine with epinephrine was infiltrated widely into the anal canal, subcutaneous tissues and surgical areas. After final inspections confirmed a nice result, a Ray-Bear sponge was advanced into the rectum and slowly withdrawn, and this showed no further prolapse of hemorrhoidal tissue. Lidocaine ointment and fluff dressings were applied.    The patient tolerated the procedure well and there were no apparent complications. All sponge, needle, and instrument counts were correct on 2 separate occasions. He was awakened, extubated, and transferred to the recovery room in satisfactory condition.       ____________________________________   Bi Paul MD  DD: 5/9/2018  8:20 AM    CC:  [facility];

## 2018-05-09 NOTE — DISCHARGE INSTRUCTIONS
ACTIVITY: Rest and take it easy for the first 24 hours.  A responsible adult is recommended to remain with you during that time.  It is normal to feel sleepy.  We encourage you to not do anything that requires balance, judgment or coordination.    MILD FLU-LIKE SYMPTOMS ARE NORMAL. YOU MAY EXPERIENCE GENERALIZED MUSCLE ACHES, THROAT IRRITATION, HEADACHE AND/OR SOME NAUSEA.    FOR 24 HOURS DO NOT:  Drive, operate machinery or run household appliances.  Drink beer or alcoholic beverages.   Make important decisions or sign legal documents.    SPECIAL INSTRUCTIONS:     Hemorrhoidectomy, Care After  Hemorrhoidectomy is the removal of enlarged (dilated) veins around the rectum. Until the surgical areas are healed, control of pain and avoiding constipation are the greatest challenges for patients.   For as long as 24 hours after receiving an anesthetic (the medication that made you sleep), and while taking narcotic pain relievers, you may feel dizzy, weak and drowsy. For that reason, the following information applies to the first 24-hour period following surgery, and continues for as long as you are taking narcotic pain medications.  · Do not drive a car, ride a bicycle, participate in activities in which you could be hurt. Do not take public transportation until you are off narcotic pain medications and until your caregiver says it is okay.  · Do not drink alcohol, take tranquilizers, or medications not prescribed or allowed by your surgical caregiver.  · Do not sign important papers or contracts for at least 24 hours or while taking narcotic medications.  · Have a responsible person with you for 24 hours.  RISKS AND COMPLICATIONS  Some problems that may occur following this procedure include:  · Infection. A germ starts growing in the tissue surrounding the site operated on. This can usually be treated with antibiotics.  · Damage to the rectal sphincter could occur. This is the muscle that opens in your anus to allow a  bowel movement. This could cause incontinence. This is uncommon.  · Bleeding following surgery can be a complication of almost any surgery. Your surgeon takes every precaution to keep this from happening.  · Complications of anesthesia.  HOME CARE INSTRUCTIONS  · Avoid straining when having bowel movements.  · Avoid heavy lifting (more than 10 pounds (4.5 kilograms)).  · Only take over-the-counter or prescription medicines for pain, discomfort, or fever as directed by your caregiver.  · Take hot sitz baths for 20 to 30 minutes, 3 to 4 times per day.  · To keep swelling down, apply an ice pack for twenty minutes three to four times per day between sitz baths. Use a towel between your skin and the ice pack. Do not do this if it causes too much discomfort.  · Keep anal area clean and dry. Following a bowel movement, you can gently wash the area with tucks (available for purchase at a [employer]) or cotton swabs. Gently pat the area dry. Do not rub the area.  · Eat a well balanced diet and drink 6 to 8 glasses of water every day to avoid constipation. A bulk laxative may be also be helpful.  SEEK MEDICAL CARE IF:   · You have increasing pain or tenderness near or in the surgical site.  · You are unable to eat or drink.  · You develop nausea or vomiting.  · You develop uncontrolled bleeding such as soaking two to three pads in one hour.  · You have constipation, not helped by changing your diet or increasing your fluid intake. Pain medications are a common cause of constipation.  · You have pain and redness (inflammation) extending outside the area of your surgery.  · You develop an unexplained oral temperature above 102° F (38.9° C), or any other signs of infection.  · You have any other questions or concerns following surgery.     This information is not intended to replace advice given to you by your health care provider. Make sure you discuss any questions you have with your health care provider.     Document  Released: 03/09/2005 Document Revised: 01/08/2016 Document Reviewed: 03/14/2016  [employer] Patient Education ©2016 [employer]      DIET: To avoid nausea, slowly advance diet as tolerated, avoiding spicy or greasy foods for the first day.  Add more substantial food to your diet according to your physician's instructions.  Babies can be fed formula or breast milk as soon as they are hungry.  INCREASE FLUIDS AND FIBER TO AVOID CONSTIPATION.    SURGICAL DRESSING/BATHING:     Take hot sitz baths for 20 to 30 minutes, 3 to 4 times per day.    FOLLOW-UP APPOINTMENT:  A follow-up appointment should be arranged with your doctor in 1-2 weeks; call to schedule.    You should CALL YOUR PHYSICIAN if you develop:  Fever greater than 101 degrees F.  Pain not relieved by medication, or persistent nausea or vomiting.  Excessive bleeding (blood soaking through dressing) or unexpected drainage from the wound.  Extreme redness or swelling around the incision site, drainage of pus or foul smelling drainage.  Inability to urinate or empty your bladder within 8 hours.  Problems with breathing or chest pain.    You should call 911 if you develop problems with breathing or chest pain.  If you are unable to contact your doctor or surgical center, you should go to the nearest emergency room or urgent care center.  Physician's telephone #: [phone]    If any questions arise, call your doctor.  If your doctor is not available, please feel free to call the Surgical Center at [phone].  The Center is open Monday through Friday from 7AM to 7PM.  You can also call the [facility] HOTLINE open 24 hours/day, 7 days/week and speak to a nurse at [phone], or toll free at [phone].    A registered nurse may call you a few days after your surgery to see how you are doing after your procedure.    MEDICATIONS: Resume taking daily medication.  Take prescribed pain medication with food.  If no medication is prescribed, you may  take non-aspirin pain medication if needed.  PAIN MEDICATION CAN BE VERY CONSTIPATING.  Take a stool softener or laxative such as senokot, pericolace, or milk of magnesia if needed.    Prescription given for Percocet.  Last pain medication given at 08:45.    If your physician has prescribed pain medication that includes Acetaminophen (Tylenol), do not take additional Acetaminophen (Tylenol) while taking the prescribed medication.    Depression / Suicide Risk    As you are discharged from this [facility] facility, it is important to learn how to keep safe from harming yourself.    Recognize the warning signs:  · Abrupt changes in personality, positive or negative- including increase in energy   · Giving away possessions  · Change in eating patterns- significant weight changes-  positive or negative  · Change in sleeping patterns- unable to sleep or sleeping all the time   · Unwillingness or inability to communicate  · Depression  · Unusual sadness, discouragement and loneliness  · Talk of wanting to die  · Neglect of personal appearance   · Rebelliousness- reckless behavior  · Withdrawal from people/activities they love  · Confusion- inability to concentrate     If you or a loved one observes any of these behaviors or has concerns about self-harm, here's what you can do:  · Talk about it- your feelings and reasons for harming yourself  · Remove any means that you might use to hurt yourself (examples: pills, rope, extension cords, firearm)  · Get professional help from the community (Mental Health, Substance Abuse, psychological counseling)  · Do not be alone:Call your Safe Contact- someone whom you trust who will be there for you.  · Call your local CRISIS HOTLINE 062-2876 or 060-102-1917  · Call your local Children's Mobile Crisis Response Team [place] (611) 641-9913 or www.SEDLine  · Call the toll free National Suicide Prevention Hotlines   · National Suicide Prevention Lifeline 525-321-ISJE  (4762)  · [facility] 800-SUICIDE (069-6489)

## 2018-08-06 ENCOUNTER — OFFICE VISIT (OUTPATIENT)
Dept: DERMATOLOGY | Facility: IMAGING CENTER | Age: 48
End: 2018-08-06
Payer: COMMERCIAL

## 2018-08-06 DIAGNOSIS — R20.8 SKIN PAIN: ICD-10-CM

## 2018-08-06 DIAGNOSIS — D22.9 MULTIPLE NEVI: ICD-10-CM

## 2018-08-06 DIAGNOSIS — L91.8 ACROCHORDON: ICD-10-CM

## 2018-08-06 DIAGNOSIS — L81.4 LENTIGINES: ICD-10-CM

## 2018-08-06 DIAGNOSIS — Z85.828 HISTORY OF BASAL CELL CARCINOMA: ICD-10-CM

## 2018-08-06 PROCEDURE — 11200 RMVL SKIN TAGS UP TO&INC 15: CPT | Performed by: DERMATOLOGY

## 2018-08-06 PROCEDURE — 99213 OFFICE O/P EST LOW 20 MIN: CPT | Mod: 25 | Performed by: DERMATOLOGY

## 2018-08-06 ASSESSMENT — ENCOUNTER SYMPTOMS
CHILLS: 0
FEVER: 0
WEIGHT LOSS: 0

## 2018-08-06 NOTE — PROGRESS NOTES
"Dermatology Return Patient Visit    Chief Complaint   Patient presents with   • Follow-Up     ROXI       Subjective:     HPI:   Aleksey Chavez is a 47 y.o. male presenting for    Full skin exam  History of BCC in 2017, treated with C&D - 11/8/17    Several brown spots on the back  States unsure if any are changing  No itching/bleeding/pain    Tag on the right side of the body   Appeared over a year ago  Slight increase in size  Has been painful when pulled on clothes    Tag on the left eyelid  Preset x years  Increasing in size  Can be painful/itchy when rubbed/irritated    History of skin cancer: Yes, BCC fall 2017  History of biopsies:Yes, Details: 2008  , shoulder negative   History of blistering/severe sunburns:Yes, Details: 7 months ago , youth, grew up in [place]  Family history of skin cancer:No  Family history of atypical moles:No        Past Medical History:   Diagnosis Date   • Deviated nasal septum 3-28-17   • Hypertrophy of nasal turbinates 3-28-17   • Hypertrophy of tonsils 3-28-17   • Snoring 3-28-17    Has not had a sleep study   • Tuberculosis     Pt. states has a \"yearly CXR at [facility] in [place] which is negative\". \"Last CXR done on \".       No current outpatient prescriptions on file prior to visit.     No current facility-administered medications on file prior to visit.        No Known Allergies    No family history on file.    Social History     Social History   • Marital status:      Spouse name: N/A   • Number of children: N/A   • Years of education: N/A     Occupational History   • Not on file.     Social History Main Topics   • Smoking status: Never Smoker   • Smokeless tobacco: Never Used   • Alcohol use Yes      Comment: \"3-4- per week\"   • Drug use: No   • Sexual activity: Not on file     Other Topics Concern   • Not on file     Social History Narrative   • No narrative on file       Review of Systems   Constitutional: Negative for chills, fever and weight loss.   Skin: " Positive for itching. Negative for rash.   All other systems reviewed and are negative.       Objective:     A full mucocutaneous exam was completed including: hair, ears, face, eyelids, conjunctiva, lips, neck, upper chest, back, left and right upper extremities (including hands/fingernails), lower extremities (including feet/toenails), buttocks, excluding external genitalia (patient refusal) with the following pertinent findings listed below. Remaining above-listed examined areas within normal limits / negative for rashes or lesions.    Physical Exam   Constitutional: He is oriented to person, place, and time and well-developed, well-nourished, and in no distress.   HENT:   Head: Normocephalic and atraumatic.       Right Ear: External ear normal.   Left Ear: External ear normal.   Nose: Nose normal.   Mouth/Throat: Oropharynx is clear and moist.   Eyes: Conjunctivae and lids are normal.   Neck: Normal range of motion. Neck supple.   Cardiovascular: Intact distal pulses.    Pulmonary/Chest: Effort normal.   Neurological: He is alert and oriented to person, place, and time.   Skin: Skin is warm and dry.        Psychiatric: Mood and affect normal.       DATA: none applicable to review    Assessment and Plan:     1. Acrochordons, +Skin pain  SIMPLE REMOVAL NOTE:  Discussed risks and benefits of removal of lesions, including scar, discoloration, minimal risk of infection. Patient verbally agreed. Areas cleaned with alcohol, iris scissors were use to remove the lesions on left eyelid, right axilla. Patient tolerated procedure well, no complications. Aftercare discussed.     2. Multiple nevi  - Benign-appearing nature of lesions discussed. Advised to return to clinic for any new or concerning changes.  - ABCDE's of melanoma discussed    3. Lentigines  - Benign-appearing nature of lesions discussed. Advised to return to clinic for any new or concerning changes.    4. History of basal cell carcinoma  Skin cancer  education  - discussed importance of sun protective clothing, eyewear  - discussed importance of daily use of broad spectrum sunscreen with SPF 30 or greater, as well as need for reapplication ~every 2 hours when exposed to UVR  - discussed importance of regular self-exams, ideally once per month, every 6-12 months exams in clinic  - ABCDE's of melanoma discussed  - patient to bring any new or concerning lesions to my attention    Followup: Return in about 6 months (around 2/6/2019).    Brenda Abarca M.D.

## 2019-05-17 ENCOUNTER — HOSPITAL ENCOUNTER (OUTPATIENT)
Dept: RADIOLOGY | Facility: MEDICAL CENTER | Age: 49
End: 2019-05-17
Attending: NEUROLOGICAL SURGERY | Admitting: NEUROLOGICAL SURGERY
Payer: COMMERCIAL

## 2019-05-17 DIAGNOSIS — Z01.810 PRE-OPERATIVE CARDIOVASCULAR EXAMINATION: ICD-10-CM

## 2019-05-17 DIAGNOSIS — Z01.811 PRE-OPERATIVE RESPIRATORY EXAMINATION: ICD-10-CM

## 2019-05-17 DIAGNOSIS — Z01.812 PRE-OPERATIVE LABORATORY EXAMINATION: ICD-10-CM

## 2019-05-17 LAB
25(OH)D3 SERPL-MCNC: 21 NG/ML (ref 30–100)
ANION GAP SERPL CALC-SCNC: 4 MMOL/L (ref 0–11.9)
APPEARANCE UR: CLEAR
APTT PPP: 27.5 SEC (ref 24.7–36)
BASOPHILS # BLD AUTO: 0.8 % (ref 0–1.8)
BASOPHILS # BLD: 0.05 K/UL (ref 0–0.12)
BILIRUB UR QL STRIP.AUTO: NEGATIVE
BUN SERPL-MCNC: 16 MG/DL (ref 8–22)
CALCIUM SERPL-MCNC: 9.2 MG/DL (ref 8.5–10.5)
CHLORIDE SERPL-SCNC: 109 MMOL/L (ref 96–112)
CO2 SERPL-SCNC: 26 MMOL/L (ref 20–33)
COLOR UR: YELLOW
CREAT SERPL-MCNC: 1.19 MG/DL (ref 0.5–1.4)
EKG IMPRESSION: NORMAL
EOSINOPHIL # BLD AUTO: 0.08 K/UL (ref 0–0.51)
EOSINOPHIL NFR BLD: 1.3 % (ref 0–6.9)
ERYTHROCYTE [DISTWIDTH] IN BLOOD BY AUTOMATED COUNT: 39.6 FL (ref 35.9–50)
GLUCOSE SERPL-MCNC: 98 MG/DL (ref 65–99)
GLUCOSE UR STRIP.AUTO-MCNC: NEGATIVE MG/DL
HCT VFR BLD AUTO: 48.5 % (ref 42–52)
HGB BLD-MCNC: 16.6 G/DL (ref 14–18)
IMM GRANULOCYTES # BLD AUTO: 0.03 K/UL (ref 0–0.11)
IMM GRANULOCYTES NFR BLD AUTO: 0.5 % (ref 0–0.9)
INR PPP: 1.02 (ref 0.87–1.13)
KETONES UR STRIP.AUTO-MCNC: NEGATIVE MG/DL
LEUKOCYTE ESTERASE UR QL STRIP.AUTO: NEGATIVE
LYMPHOCYTES # BLD AUTO: 1.59 K/UL (ref 1–4.8)
LYMPHOCYTES NFR BLD: 25.9 % (ref 22–41)
MCH RBC QN AUTO: 30.6 PG (ref 27–33)
MCHC RBC AUTO-ENTMCNC: 34.2 G/DL (ref 33.7–35.3)
MCV RBC AUTO: 89.5 FL (ref 81.4–97.8)
MICRO URNS: NORMAL
MONOCYTES # BLD AUTO: 0.58 K/UL (ref 0–0.85)
MONOCYTES NFR BLD AUTO: 9.4 % (ref 0–13.4)
NEUTROPHILS # BLD AUTO: 3.82 K/UL (ref 1.82–7.42)
NEUTROPHILS NFR BLD: 62.1 % (ref 44–72)
NITRITE UR QL STRIP.AUTO: NEGATIVE
NRBC # BLD AUTO: 0 K/UL
NRBC BLD-RTO: 0 /100 WBC
PH UR STRIP.AUTO: 6.5 [PH]
PLATELET # BLD AUTO: 221 K/UL (ref 164–446)
PMV BLD AUTO: 8.9 FL (ref 9–12.9)
POTASSIUM SERPL-SCNC: 4.3 MMOL/L (ref 3.6–5.5)
PROT UR QL STRIP: NEGATIVE MG/DL
PROTHROMBIN TIME: 13.5 SEC (ref 12–14.6)
RBC # BLD AUTO: 5.42 M/UL (ref 4.7–6.1)
RBC UR QL AUTO: NEGATIVE
SODIUM SERPL-SCNC: 139 MMOL/L (ref 135–145)
SP GR UR STRIP.AUTO: 1.02
UROBILINOGEN UR STRIP.AUTO-MCNC: 1 MG/DL
WBC # BLD AUTO: 6.2 K/UL (ref 4.8–10.8)

## 2019-05-17 PROCEDURE — 85025 COMPLETE CBC W/AUTO DIFF WBC: CPT

## 2019-05-17 PROCEDURE — 85730 THROMBOPLASTIN TIME PARTIAL: CPT

## 2019-05-17 PROCEDURE — 82306 VITAMIN D 25 HYDROXY: CPT

## 2019-05-17 PROCEDURE — 81003 URINALYSIS AUTO W/O SCOPE: CPT

## 2019-05-17 PROCEDURE — 85610 PROTHROMBIN TIME: CPT

## 2019-05-17 PROCEDURE — 36415 COLL VENOUS BLD VENIPUNCTURE: CPT

## 2019-05-17 PROCEDURE — 93010 ELECTROCARDIOGRAM REPORT: CPT | Performed by: INTERNAL MEDICINE

## 2019-05-17 PROCEDURE — 93005 ELECTROCARDIOGRAM TRACING: CPT

## 2019-05-17 PROCEDURE — 80048 BASIC METABOLIC PNL TOTAL CA: CPT

## 2019-05-17 PROCEDURE — 71046 X-RAY EXAM CHEST 2 VIEWS: CPT

## 2019-05-21 ENCOUNTER — HOSPITAL ENCOUNTER (OUTPATIENT)
Dept: RADIOLOGY | Facility: MEDICAL CENTER | Age: 49
End: 2019-05-21
Attending: NEUROLOGICAL SURGERY
Payer: COMMERCIAL

## 2019-05-21 DIAGNOSIS — M51.26 OTHER INTERVERTEBRAL DISC DISPLACEMENT, LUMBAR REGION: ICD-10-CM

## 2019-05-21 PROCEDURE — 72110 X-RAY EXAM L-2 SPINE 4/>VWS: CPT

## 2019-05-22 ENCOUNTER — ANESTHESIA (OUTPATIENT)
Dept: SURGERY | Facility: MEDICAL CENTER | Age: 49
End: 2019-05-22
Payer: COMMERCIAL

## 2019-05-22 ENCOUNTER — ANESTHESIA EVENT (OUTPATIENT)
Dept: SURGERY | Facility: MEDICAL CENTER | Age: 49
End: 2019-05-22
Payer: COMMERCIAL

## 2019-05-22 ENCOUNTER — APPOINTMENT (OUTPATIENT)
Dept: RADIOLOGY | Facility: MEDICAL CENTER | Age: 49
End: 2019-05-22
Attending: NEUROLOGICAL SURGERY
Payer: COMMERCIAL

## 2019-05-22 ENCOUNTER — HOSPITAL ENCOUNTER (OUTPATIENT)
Facility: MEDICAL CENTER | Age: 49
End: 2019-05-22
Attending: NEUROLOGICAL SURGERY | Admitting: NEUROLOGICAL SURGERY
Payer: COMMERCIAL

## 2019-05-22 VITALS
TEMPERATURE: 97.2 F | WEIGHT: 221.34 LBS | HEIGHT: 76 IN | HEART RATE: 95 BPM | BODY MASS INDEX: 26.95 KG/M2 | RESPIRATION RATE: 16 BRPM | OXYGEN SATURATION: 98 %

## 2019-05-22 DIAGNOSIS — G89.18 POSTOPERATIVE PAIN AFTER SPINAL SURGERY: ICD-10-CM

## 2019-05-22 PROCEDURE — 700105 HCHG RX REV CODE 258: Performed by: NEUROLOGICAL SURGERY

## 2019-05-22 PROCEDURE — 500367 HCHG DRAIN KIT, HEMOVAC: Performed by: NEUROLOGICAL SURGERY

## 2019-05-22 PROCEDURE — 160047 HCHG PACU  - EA ADDL 30 MINS PHASE II: Performed by: NEUROLOGICAL SURGERY

## 2019-05-22 PROCEDURE — 700102 HCHG RX REV CODE 250 W/ 637 OVERRIDE(OP): Performed by: ANESTHESIOLOGY

## 2019-05-22 PROCEDURE — 700102 HCHG RX REV CODE 250 W/ 637 OVERRIDE(OP)

## 2019-05-22 PROCEDURE — 160046 HCHG PACU - 1ST 60 MINS PHASE II: Performed by: NEUROLOGICAL SURGERY

## 2019-05-22 PROCEDURE — 160041 HCHG SURGERY MINUTES - EA ADDL 1 MIN LEVEL 4: Performed by: NEUROLOGICAL SURGERY

## 2019-05-22 PROCEDURE — 160002 HCHG RECOVERY MINUTES (STAT): Performed by: NEUROLOGICAL SURGERY

## 2019-05-22 PROCEDURE — 700111 HCHG RX REV CODE 636 W/ 250 OVERRIDE (IP): Performed by: ANESTHESIOLOGY

## 2019-05-22 PROCEDURE — 72020 X-RAY EXAM OF SPINE 1 VIEW: CPT

## 2019-05-22 PROCEDURE — 160035 HCHG PACU - 1ST 60 MINS PHASE I: Performed by: NEUROLOGICAL SURGERY

## 2019-05-22 PROCEDURE — A9270 NON-COVERED ITEM OR SERVICE: HCPCS | Performed by: ANESTHESIOLOGY

## 2019-05-22 PROCEDURE — 700101 HCHG RX REV CODE 250: Performed by: ANESTHESIOLOGY

## 2019-05-22 PROCEDURE — 700105 HCHG RX REV CODE 258: Performed by: ANESTHESIOLOGY

## 2019-05-22 PROCEDURE — 160009 HCHG ANES TIME/MIN: Performed by: NEUROLOGICAL SURGERY

## 2019-05-22 PROCEDURE — 700102 HCHG RX REV CODE 250 W/ 637 OVERRIDE(OP): Performed by: PHYSICIAN ASSISTANT

## 2019-05-22 PROCEDURE — 700101 HCHG RX REV CODE 250: Performed by: NEUROLOGICAL SURGERY

## 2019-05-22 PROCEDURE — 160048 HCHG OR STATISTICAL LEVEL 1-5: Performed by: NEUROLOGICAL SURGERY

## 2019-05-22 PROCEDURE — A9270 NON-COVERED ITEM OR SERVICE: HCPCS

## 2019-05-22 PROCEDURE — 500002 HCHG ADHESIVE, DERMABOND: Performed by: NEUROLOGICAL SURGERY

## 2019-05-22 PROCEDURE — 501838 HCHG SUTURE GENERAL: Performed by: NEUROLOGICAL SURGERY

## 2019-05-22 PROCEDURE — 160029 HCHG SURGERY MINUTES - 1ST 30 MINS LEVEL 4: Performed by: NEUROLOGICAL SURGERY

## 2019-05-22 PROCEDURE — 700111 HCHG RX REV CODE 636 W/ 250 OVERRIDE (IP)

## 2019-05-22 PROCEDURE — 160025 RECOVERY II MINUTES (STATS): Performed by: NEUROLOGICAL SURGERY

## 2019-05-22 PROCEDURE — A9270 NON-COVERED ITEM OR SERVICE: HCPCS | Performed by: PHYSICIAN ASSISTANT

## 2019-05-22 RX ORDER — METHOCARBAMOL 750 MG/1
750 TABLET, FILM COATED ORAL 3 TIMES DAILY PRN
Status: DISCONTINUED | OUTPATIENT
Start: 2019-05-22 | End: 2019-05-22 | Stop reason: HOSPADM

## 2019-05-22 RX ORDER — HYDROMORPHONE HYDROCHLORIDE 1 MG/ML
0.2 INJECTION, SOLUTION INTRAMUSCULAR; INTRAVENOUS; SUBCUTANEOUS
Status: DISCONTINUED | OUTPATIENT
Start: 2019-05-22 | End: 2019-05-22 | Stop reason: HOSPADM

## 2019-05-22 RX ORDER — OXYCODONE HCL 5 MG/5 ML
SOLUTION, ORAL ORAL
Status: COMPLETED
Start: 2019-05-22 | End: 2019-05-22

## 2019-05-22 RX ORDER — SODIUM CHLORIDE, SODIUM LACTATE, POTASSIUM CHLORIDE, CALCIUM CHLORIDE 600; 310; 30; 20 MG/100ML; MG/100ML; MG/100ML; MG/100ML
INJECTION, SOLUTION INTRAVENOUS
Status: DISCONTINUED | OUTPATIENT
Start: 2019-05-22 | End: 2019-05-22 | Stop reason: SURG

## 2019-05-22 RX ORDER — HYDRALAZINE HYDROCHLORIDE 20 MG/ML
5 INJECTION INTRAMUSCULAR; INTRAVENOUS
Status: DISCONTINUED | OUTPATIENT
Start: 2019-05-22 | End: 2019-05-22 | Stop reason: HOSPADM

## 2019-05-22 RX ORDER — KETOROLAC TROMETHAMINE 30 MG/ML
INJECTION, SOLUTION INTRAMUSCULAR; INTRAVENOUS PRN
Status: DISCONTINUED | OUTPATIENT
Start: 2019-05-22 | End: 2019-05-22 | Stop reason: SURG

## 2019-05-22 RX ORDER — ONDANSETRON 2 MG/ML
INJECTION INTRAMUSCULAR; INTRAVENOUS PRN
Status: DISCONTINUED | OUTPATIENT
Start: 2019-05-22 | End: 2019-05-22 | Stop reason: SURG

## 2019-05-22 RX ORDER — VANCOMYCIN HCL 900 MCG/MG
POWDER (GRAM) MISCELLANEOUS
Status: DISCONTINUED | OUTPATIENT
Start: 2019-05-22 | End: 2019-05-22 | Stop reason: HOSPADM

## 2019-05-22 RX ORDER — CEPHALEXIN 500 MG/1
500 CAPSULE ORAL 4 TIMES DAILY
Qty: 20 CAP | Refills: 0 | Status: SHIPPED | OUTPATIENT
Start: 2019-05-22 | End: 2019-05-27

## 2019-05-22 RX ORDER — PHENYLEPHRINE HYDROCHLORIDE 10 MG/ML
INJECTION, SOLUTION INTRAMUSCULAR; INTRAVENOUS; SUBCUTANEOUS PRN
Status: DISCONTINUED | OUTPATIENT
Start: 2019-05-22 | End: 2019-05-22 | Stop reason: SURG

## 2019-05-22 RX ORDER — OXYCODONE HCL 5 MG/5 ML
5 SOLUTION, ORAL ORAL
Status: COMPLETED | OUTPATIENT
Start: 2019-05-22 | End: 2019-05-22

## 2019-05-22 RX ORDER — BUPIVACAINE HYDROCHLORIDE AND EPINEPHRINE 5; 5 MG/ML; UG/ML
INJECTION, SOLUTION EPIDURAL; INTRACAUDAL; PERINEURAL
Status: DISCONTINUED | OUTPATIENT
Start: 2019-05-22 | End: 2019-05-22 | Stop reason: HOSPADM

## 2019-05-22 RX ORDER — HYDROCODONE BITARTRATE AND ACETAMINOPHEN 5; 325 MG/1; MG/1
1-2 TABLET ORAL EVERY 6 HOURS PRN
Qty: 40 TAB | Refills: 0 | Status: SHIPPED | OUTPATIENT
Start: 2019-05-22 | End: 2019-05-29

## 2019-05-22 RX ORDER — DIPHENHYDRAMINE HYDROCHLORIDE 50 MG/ML
12.5 INJECTION INTRAMUSCULAR; INTRAVENOUS
Status: DISCONTINUED | OUTPATIENT
Start: 2019-05-22 | End: 2019-05-22 | Stop reason: HOSPADM

## 2019-05-22 RX ORDER — MAGNESIUM HYDROXIDE 1200 MG/15ML
LIQUID ORAL
Status: COMPLETED | OUTPATIENT
Start: 2019-05-22 | End: 2019-05-22

## 2019-05-22 RX ORDER — GABAPENTIN 300 MG/1
600 CAPSULE ORAL ONCE
Status: COMPLETED | OUTPATIENT
Start: 2019-05-22 | End: 2019-05-22

## 2019-05-22 RX ORDER — LABETALOL HYDROCHLORIDE 5 MG/ML
5 INJECTION, SOLUTION INTRAVENOUS
Status: DISCONTINUED | OUTPATIENT
Start: 2019-05-22 | End: 2019-05-22 | Stop reason: HOSPADM

## 2019-05-22 RX ORDER — SODIUM CHLORIDE, SODIUM LACTATE, POTASSIUM CHLORIDE, CALCIUM CHLORIDE 600; 310; 30; 20 MG/100ML; MG/100ML; MG/100ML; MG/100ML
INJECTION, SOLUTION INTRAVENOUS CONTINUOUS
Status: DISCONTINUED | OUTPATIENT
Start: 2019-05-22 | End: 2019-05-22 | Stop reason: HOSPADM

## 2019-05-22 RX ORDER — ONDANSETRON 2 MG/ML
4 INJECTION INTRAMUSCULAR; INTRAVENOUS
Status: DISCONTINUED | OUTPATIENT
Start: 2019-05-22 | End: 2019-05-22 | Stop reason: HOSPADM

## 2019-05-22 RX ORDER — CEFAZOLIN SODIUM 1 G/3ML
INJECTION, POWDER, FOR SOLUTION INTRAMUSCULAR; INTRAVENOUS PRN
Status: DISCONTINUED | OUTPATIENT
Start: 2019-05-22 | End: 2019-05-22 | Stop reason: SURG

## 2019-05-22 RX ORDER — MEPERIDINE HYDROCHLORIDE 25 MG/ML
12.5 INJECTION INTRAMUSCULAR; INTRAVENOUS; SUBCUTANEOUS
Status: DISCONTINUED | OUTPATIENT
Start: 2019-05-22 | End: 2019-05-22 | Stop reason: HOSPADM

## 2019-05-22 RX ORDER — HYDROMORPHONE HYDROCHLORIDE 1 MG/ML
0.4 INJECTION, SOLUTION INTRAMUSCULAR; INTRAVENOUS; SUBCUTANEOUS
Status: DISCONTINUED | OUTPATIENT
Start: 2019-05-22 | End: 2019-05-22 | Stop reason: HOSPADM

## 2019-05-22 RX ORDER — OXYCODONE HCL 5 MG/5 ML
10 SOLUTION, ORAL ORAL
Status: COMPLETED | OUTPATIENT
Start: 2019-05-22 | End: 2019-05-22

## 2019-05-22 RX ORDER — METHOCARBAMOL 750 MG/1
750 TABLET, FILM COATED ORAL 3 TIMES DAILY PRN
Qty: 90 TAB | Refills: 0 | Status: SHIPPED | OUTPATIENT
Start: 2019-05-22

## 2019-05-22 RX ORDER — HYDROMORPHONE HYDROCHLORIDE 1 MG/ML
0.1 INJECTION, SOLUTION INTRAMUSCULAR; INTRAVENOUS; SUBCUTANEOUS
Status: DISCONTINUED | OUTPATIENT
Start: 2019-05-22 | End: 2019-05-22 | Stop reason: HOSPADM

## 2019-05-22 RX ORDER — ACETAMINOPHEN 500 MG
1000 TABLET ORAL ONCE
Status: COMPLETED | OUTPATIENT
Start: 2019-05-22 | End: 2019-05-22

## 2019-05-22 RX ORDER — MIDAZOLAM HYDROCHLORIDE 1 MG/ML
1 INJECTION INTRAMUSCULAR; INTRAVENOUS
Status: DISCONTINUED | OUTPATIENT
Start: 2019-05-22 | End: 2019-05-22 | Stop reason: HOSPADM

## 2019-05-22 RX ORDER — HYDROMORPHONE HYDROCHLORIDE 1 MG/ML
INJECTION, SOLUTION INTRAMUSCULAR; INTRAVENOUS; SUBCUTANEOUS
Status: COMPLETED
Start: 2019-05-22 | End: 2019-05-22

## 2019-05-22 RX ORDER — DEXAMETHASONE SODIUM PHOSPHATE 4 MG/ML
INJECTION, SOLUTION INTRA-ARTICULAR; INTRALESIONAL; INTRAMUSCULAR; INTRAVENOUS; SOFT TISSUE PRN
Status: DISCONTINUED | OUTPATIENT
Start: 2019-05-22 | End: 2019-05-22 | Stop reason: SURG

## 2019-05-22 RX ORDER — HYDROCODONE BITARTRATE AND ACETAMINOPHEN 5; 325 MG/1; MG/1
1-2 TABLET ORAL EVERY 4 HOURS PRN
Status: DISCONTINUED | OUTPATIENT
Start: 2019-05-22 | End: 2019-05-22 | Stop reason: HOSPADM

## 2019-05-22 RX ORDER — SODIUM CHLORIDE, SODIUM LACTATE, POTASSIUM CHLORIDE, CALCIUM CHLORIDE 600; 310; 30; 20 MG/100ML; MG/100ML; MG/100ML; MG/100ML
INJECTION, SOLUTION INTRAVENOUS CONTINUOUS
Status: DISCONTINUED | OUTPATIENT
Start: 2019-05-22 | End: 2019-05-22

## 2019-05-22 RX ORDER — HALOPERIDOL 5 MG/ML
1 INJECTION INTRAMUSCULAR
Status: DISCONTINUED | OUTPATIENT
Start: 2019-05-22 | End: 2019-05-22 | Stop reason: HOSPADM

## 2019-05-22 RX ADMIN — LIDOCAINE HYDROCHLORIDE 100 MG: 20 INJECTION, SOLUTION INTRAVENOUS at 15:52

## 2019-05-22 RX ADMIN — SUCCINYLCHOLINE CHLORIDE 200 MG: 20 INJECTION, SOLUTION INTRAMUSCULAR; INTRAVENOUS at 15:52

## 2019-05-22 RX ADMIN — FENTANYL CITRATE 100 MCG: 50 INJECTION, SOLUTION INTRAMUSCULAR; INTRAVENOUS at 15:58

## 2019-05-22 RX ADMIN — HYDROMORPHONE HYDROCHLORIDE 0.4 MG: 1 INJECTION, SOLUTION INTRAMUSCULAR; INTRAVENOUS; SUBCUTANEOUS at 17:10

## 2019-05-22 RX ADMIN — PROPOFOL 200 MG: 10 INJECTION, EMULSION INTRAVENOUS at 15:52

## 2019-05-22 RX ADMIN — ACETAMINOPHEN 1000 MG: 500 TABLET ORAL at 12:07

## 2019-05-22 RX ADMIN — OXYCODONE HYDROCHLORIDE 10 MG: 5 SOLUTION ORAL at 17:11

## 2019-05-22 RX ADMIN — SUGAMMADEX 200 MG: 100 INJECTION, SOLUTION INTRAVENOUS at 16:44

## 2019-05-22 RX ADMIN — ROCURONIUM BROMIDE 50 MG: 10 INJECTION, SOLUTION INTRAVENOUS at 15:52

## 2019-05-22 RX ADMIN — SODIUM CHLORIDE, POTASSIUM CHLORIDE, SODIUM LACTATE AND CALCIUM CHLORIDE: 600; 310; 30; 20 INJECTION, SOLUTION INTRAVENOUS at 12:07

## 2019-05-22 RX ADMIN — ONDANSETRON 4 MG: 2 INJECTION INTRAMUSCULAR; INTRAVENOUS at 16:43

## 2019-05-22 RX ADMIN — PHENYLEPHRINE HYDROCHLORIDE 100 MCG: 10 INJECTION INTRAVENOUS at 16:04

## 2019-05-22 RX ADMIN — KETOROLAC TROMETHAMINE 30 MG: 30 INJECTION, SOLUTION INTRAMUSCULAR at 16:43

## 2019-05-22 RX ADMIN — EPHEDRINE SULFATE 10 MG: 50 INJECTION INTRAMUSCULAR; INTRAVENOUS; SUBCUTANEOUS at 16:04

## 2019-05-22 RX ADMIN — METHOCARBAMOL TABLETS 750 MG: 750 TABLET, COATED ORAL at 17:40

## 2019-05-22 RX ADMIN — LIDOCAINE HYDROCHLORIDE 0.5 ML: 10 INJECTION, SOLUTION EPIDURAL; INFILTRATION; INTRACAUDAL at 12:06

## 2019-05-22 RX ADMIN — Medication 10 MG: at 17:11

## 2019-05-22 RX ADMIN — CEFAZOLIN 2 G: 330 INJECTION, POWDER, FOR SOLUTION INTRAMUSCULAR; INTRAVENOUS at 15:52

## 2019-05-22 RX ADMIN — FENTANYL CITRATE 150 MCG: 50 INJECTION, SOLUTION INTRAMUSCULAR; INTRAVENOUS at 15:52

## 2019-05-22 RX ADMIN — GABAPENTIN 600 MG: 300 CAPSULE ORAL at 12:07

## 2019-05-22 RX ADMIN — DEXAMETHASONE SODIUM PHOSPHATE 8 MG: 4 INJECTION, SOLUTION INTRA-ARTICULAR; INTRALESIONAL; INTRAMUSCULAR; INTRAVENOUS; SOFT TISSUE at 15:52

## 2019-05-22 RX ADMIN — SODIUM CHLORIDE, POTASSIUM CHLORIDE, SODIUM LACTATE AND CALCIUM CHLORIDE: 600; 310; 30; 20 INJECTION, SOLUTION INTRAVENOUS at 15:46

## 2019-05-22 ASSESSMENT — PAIN SCALES - GENERAL: PAIN_LEVEL: 2

## 2019-05-22 NOTE — OR SURGEON
Immediate Post OP Note    PreOp Diagnosis: r L4/5 disc protrusion    PostOp Diagnosis: as above    Procedure(s):  LAMINECTOMY, SPINE, LUMBAR, L4-5 DECOMPRESSIVE, RIGHT L4-5 MICRODISCECTOMY - Wound Class: Clean with Drain  FORAMINOTOMY, SPINE - Wound Class: Clean with Drain    Surgeon(s):  Stephania Crockett M.D.    Anesthesiologist/Type of Anesthesia:  Anesthesiologist: Alexi Betts D.O./General    Surgical Staff:  Circulator: Jolene Lakhani R.N.  Scrub Person: Marla Rivera Assist: Rory Tamayo P.A.-C.  Radiology Technologist: Dahlia Chow    Specimens removed if any:            5/22/2019 4:57 PM Stephania Crockett M.D.

## 2019-05-22 NOTE — ANESTHESIA PROCEDURE NOTES
Airway  Date/Time: 5/22/2019 3:52 PM  Performed by: MAGDIEL MARC  Authorized by: MAGDIEL MARC     Location:  OR  Urgency:  Elective  Indications for Airway Management:  Anesthesia  Spontaneous Ventilation: absent    Sedation Level:  Deep  Preoxygenated: Yes    Patient Position:  Sniffing  Mask Difficulty Assessment:  0 - not attempted  Final Airway Type:  Endotracheal airway  Final Endotracheal Airway:  ETT  Cuffed: Yes    Technique Used for Successful ETT Placement:  Direct laryngoscopy  Insertion Site:  Oral  Blade Type:  Rafael  Laryngoscope Blade/Videolaryngoscope Blade Size:  4  ETT Size (mm):  8.0  Measured from:  Teeth  ETT to Teeth (cm):  26  Placement Verified by: auscultation and capnometry    Cormack-Lehane Classification:  Grade IIb - view of arytenoids or posterior of glottis only  Number of Attempts at Approach:  1

## 2019-05-23 NOTE — OR NURSING
Pt's VSS; denies N/V; states pain is at tolerable level. Dressing CDI to back. Taking PO well. Report given to Sona HOWARD

## 2019-05-23 NOTE — ANESTHESIA QCDR
2019 [facility] Clinical Data Registry (for Quality Improvement)     Postoperative nausea/vomiting risk protocol (Adult = 18 yrs and Pediatric 3-17 yrs)- (430 and 463)  General inhalation anesthetic (NOT TIVA) with PONV risk factors: Yes  Provision of anti-emetic therapy with at least 2 different classes of agents: Yes   Patient DID NOT receive anti-emetic therapy and reason is documented in Medical Record:  N/A    Multimodal Pain Management- (AQI59)  Patient undergoing Elective Surgery (i.e. Outpatient, or ASC, or Prescheduled Surgery prior to Hospital Admission): Yes  Use of Multimodal Pain Management, two or more drugs and/or interventions, NOT including systemic opioids: Yes   Exception: Documented allergy to multiple classes of analgesics:  N/A    PACU assessment of acute postoperative pain prior to Anesthesia Care End- Applies to Patients Age = 18- (ABG7)  Initial PACU pain score is which of the following: < 7/10  Patient unable to report pain score: N/A    Post-anesthetic transfer of care checklist/protocol to PACU/ICU- (426 and 427)  Upon conclusion of case, patient transferred to which of the following locations: PACU/Non-ICU  Use of transfer checklist/protocol: Yes  Exclusion: Service Performed in Patient Hospital Room (and thus did not require transfer): N/A    PACU Reintubation- (AQI31)  General anesthesia requiring endotracheal intubation (ETT) along with subsequent extubation in OR or PACU: Yes  Required reintubation in the PACU: No   Extubation was a planned trial documented in the medical record prior to removal of the original airway device:  N/A    Unplanned admission to ICU related to anesthesia service up through end of PACU care- (MD51)  Unplanned admission to ICU (not initially anticipated at anesthesia start time): No

## 2019-05-23 NOTE — ANESTHESIA TIME REPORT
Anesthesia Start and Stop Event Times     Date Time Event    5/22/2019 1546 Anesthesia Start     1704 Anesthesia Stop        Responsible Staff  05/22/19    Name Role Begin End    Alexi Betts D.O. Anesth 1546 1704        Preop Diagnosis (Free Text):  Pre-op Diagnosis     DISPLACEMENT OF LUMBAR INTERVERTEBRAL DISC WITHOUT MYELOPATHY        Preop Diagnosis (Codes):  Diagnosis Information     Diagnosis Code(s):         Post op Diagnosis  Displacement of lumbar intervertebral disc      Premium Reason  A. 3PM - 7AM    Comments:

## 2019-05-23 NOTE — OP REPORT
1. DATE OF SURGERY:  5/23/2019    2 SURGEON:  Stephania Crockett MD, PhD, FRACS, FACS, FAANS    3 ASSISTANT:  Rory Tamayo PA-C    An assistant was crucial to have during the case as the assistant helped with positioning, keeping the field clear of blood and retraction. This case could not be done easily without a qualified assistant. It was medically necessary    4. TYPE OF ANESTHESIA:  General anesthesia with endotracheal intubation    5. PREOPERATIVE DIAGNOSIS:  Lumbar spinal stenosis    6. POSTOPERATIVE DIAGNOSIS:  Lumbar spinal stenosis    7. HISTORY: See formal admission H and P    This is a 48-year-old .  He had a work-related injury.  He was injured at work.  He developed right leg pain with some weakness.  He also had some numbness in the L5 distribution.  He declined the epidural.  He did physical therapy and did try some medications but he continued with pain and weakness.    Preoperatively 4/5 weakness in the right EHL with right L5 numbness.  We consequently offered him surgery.      8. PREOPERATIVE PHYSICAL EXAMINATION: See formal admission H and P    9. TITLE OF PROCEDURE:  • L4 Decompressive lumbar laminectomy with bilateral foraminotomies  • L5 Decompressive lumbar laminectomy with bilateral foraminotomies  • R L4/5 microdiscectomy  • Microscopic microdissection.  •     10. OPERATIVE FINDINGS: There is a degree of lateral recess stenosis.  Significantly there is a large right-sided subligamentous disc protrusion.  This was removed in a piecemeal fashion.  He was well decompressed.    11. OPERATED LEVELS: L4-5    12. IMPLANTS USED: Nil    13. COMPLICATIONS:  Nil.    14. ESTIMATED BLOOD LOSS:      50 mL      15. OPERATIVE DETAILS:  After fully informed consent, the patient was brought to the operating room.  General anesthesia was administered.  The patient was given intravenous antibiotic.  The patient was then turned prone onto the OSI operating table  and Jung frame.  All pressure points  were padded.  Initial fluoroscopic localization was taken for skin marking.  The posterior lumbar region was prepped and draped in a standard fashion.      Using the initial x-ray as a guide, a midline skin incision was then affected.  This involved the spinous processes at the affected levels where the laminectomies were to be done.  A bilateral subperiosteal exposure was affected.  Great care was taken not to violate the facet joints.      Once the exposure had been done, self-retaining retractors were placed.  Hemostasis was obtained.  A repeat lateral x-ray was then taken to confirm the level.  Laminectomy was affected. The laminectomy was affected with Leksell rongeurs initially, then I used an AM-8 drill bit under microscopic magnification and illumination to thin down the lamina.      Once the lamina was paper thin, I used combination of 4, 3, and 2 mm Kerrison punches to remove the remaining bone and ligamentum flavum.  I worked from a cranial to a caudal direction so that I was in the line of the nerve roots.  Initially, a central decompression was affected, then in an undercutting fashion, I removed the ligamentum flavum and lateral recesses with great care not to take too much of the facet joints.    The laminectomy involve the inferior half of the inferior two thirds of L4 and a small portion of L5.    The microscope was brought in the field of view.    Gentle medial retraction of the right L5 nerve root the disc bulge is identified in size removed in piecemeal fashion.  This is a large subligamentous bulge.  The disc space was explored to ensure there were no loose fragments.  He was well decompressed.    Sequentially, I followed the L4 and L5 nerve roots on both sides and a complete decompression was affected.      Hemostasis was obtained.  All the nerve roots were free.  I could pass a blunt dissector at each of the foramina with ease.  Hemostasis was obtained with a combination of Gelfoam and  diathermy.  I then placed a vancomycin powder over the wound, but not on the dura.  A suction subfascial Hemovac was placed.       I placed local anaesthetic throughout the paraspinal muscles.       The wound was then closed using multiple interrupted Vicryl sutures starting with 0 Vicryl sutures for the deep fascia, 2-0 Vicryl sutures for the sub-dermis and then staples for the skin.    Hemostasis was obtained.  A 2-0 vicryl was used for the drain.  Dermabond was placed over the puncture sites of the On-Q catheters.  A dressing was applied.      All counts were correct at the end of the case and all instrumentation was accounted for.  The patient was then carefully turned supine again onto a regular operating table without incident.    At the end of case, the patient was moving both lower extremities well.    16 PROGNOSIS:  The surgery went well.  We plan to get the patient home tonight.  When the patient goes home, he/she will be discharged home on narcotic analgesia,  a muscle relaxant and oral antibiotic, usually Keflex.  The plan will be to follow up in 2 weeks in the office.  We will call the patient next week to ensure there are not any problems.  He/she can shower in 72 hours but is instructed to keep the wound dry.  The patient has also been instructed to abstain from smoking or any anti-inflammatories.     Stephania Crockett MD, PhD, FRACS, FACS, FAANS

## 2019-05-23 NOTE — OR NURSING
Arrived from PACU, VSS, awake and alert Family at the bedside and aware that he will stay until 9 PM. Ajith LOVING.

## 2019-05-23 NOTE — ANESTHESIA POSTPROCEDURE EVALUATION
Patient: Aleksey Chavez    Procedure Summary     Date:  05/22/19 Room / Location:  [facility] OR 07 / SURGERY [facility]    Anesthesia Start:  1546 Anesthesia Stop:  1704    Procedures:       LAMINECTOMY, SPINE, LUMBAR, L4-5 DECOMPRESSIVE, RIGHT L4-5 MICRODISCECTOMY (Spine Lumbar)      FORAMINOTOMY, SPINE (Bilateral Spine Lumbar) Diagnosis:  (DISPLACEMENT OF LUMBAR INTERVERTEBRAL DISC WITHOUT MYELOPATH)    Surgeon:  Stephania Crockett M.D. Responsible Provider:  Alexi Betts D.O.    Anesthesia Type:  general ASA Status:  2          Final Anesthesia Type: general  Last vitals  BP   NIBP: 151/82    Temp   36.2 °C (97.2 °F)    Pulse   Pulse: 91, Heart Rate (Monitored): 91   Resp   17    SpO2   96 %      Anesthesia Post Evaluation    Patient location during evaluation: PACU  Patient participation: complete - patient participated  Level of consciousness: awake and alert  Pain score: 2    Airway patency: patent  Anesthetic complications: no  Cardiovascular status: hemodynamically stable  Respiratory status: acceptable  Hydration status: euvolemic    PONV: none           Nurse Pain Score: 4 (NPRS)

## 2019-05-23 NOTE — PROGRESS NOTES
Pt up amb to BR, pt voided. hemovac d/c'd per MD order gauze applied re taped surgical drsg. Pt tolerated procedure

## 2019-05-23 NOTE — DISCHARGE INSTRUCTIONS
ACTIVITY: Rest and take it easy for the first 24 hours.  A responsible adult is recommended to remain with you during that time.  It is normal to feel sleepy.  We encourage you to not do anything that requires balance, judgment or coordination.    MILD FLU-LIKE SYMPTOMS ARE NORMAL. YOU MAY EXPERIENCE GENERALIZED MUSCLE ACHES, THROAT IRRITATION, HEADACHE AND/OR SOME NAUSEA.    FOR 24 HOURS DO NOT:  Drive, operate machinery or run household appliances.  Drink beer or alcoholic beverages.   Make important decisions or sign legal documents.    SPECIAL INSTRUCTIONS: no smoking and no anti-inflammatory medications until cleared by MD     DIET: To avoid nausea, slowly advance diet as tolerated, avoiding spicy or greasy foods for the first day.  Add more substantial food to your diet according to your physician's instructions.  Babies can be fed formula or breast milk as soon as they are hungry.  INCREASE FLUIDS AND FIBER TO AVOID CONSTIPATION.    SURGICAL DRESSING/BATHING: ok to shower in 72 hours    FOLLOW-UP APPOINTMENT:  A follow-up appointment should be arranged with your doctor in 2 weeks; call to schedule.    You should CALL YOUR PHYSICIAN if you develop:  Fever greater than 101 degrees F.  Pain not relieved by medication, or persistent nausea or vomiting.  Excessive bleeding (blood soaking through dressing) or unexpected drainage from the wound.  Extreme redness or swelling around the incision site, drainage of pus or foul smelling drainage.  Inability to urinate or empty your bladder within 8 hours.  Problems with breathing or chest pain.    You should call 911 if you develop problems with breathing or chest pain.  If you are unable to contact your doctor or surgical center, you should go to the nearest emergency room or urgent care center.  Physician's telephone #: [phone]    If any questions arise, call your doctor.  If your doctor is not available, please feel free to call the Surgical Center at  [phone].  The Center is open Monday through Friday from 7AM to 7PM.  You can also call the HEALTH HOTLINE open 24 hours/day, 7 days/week and speak to a nurse at [phone], or toll free at [phone].    A registered nurse may call you a few days after your surgery to see how you are doing after your procedure.    MEDICATIONS: Resume taking daily medication.  Take prescribed pain medication with food.  If no medication is prescribed, you may take non-aspirin pain medication if needed.  PAIN MEDICATION CAN BE VERY CONSTIPATING.  Take a stool softener or laxative such as senokot, pericolace, or milk of magnesia if needed.    Prescription given pre op.  Last pain medication given at 5:11    If your physician has prescribed pain medication that includes Acetaminophen (Tylenol), do not take additional Acetaminophen (Tylenol) while taking the prescribed medication.    Depression / Suicide Risk    As you are discharged from this [facility] facility, it is important to learn how to keep safe from harming yourself.    Recognize the warning signs:  · Abrupt changes in personality, positive or negative- including increase in energy   · Giving away possessions  · Change in eating patterns- significant weight changes-  positive or negative  · Change in sleeping patterns- unable to sleep or sleeping all the time   · Unwillingness or inability to communicate  · Depression  · Unusual sadness, discouragement and loneliness  · Talk of wanting to die  · Neglect of personal appearance   · Rebelliousness- reckless behavior  · Withdrawal from people/activities they love  · Confusion- inability to concentrate     If you or a loved one observes any of these behaviors or has concerns about self-harm, here's what you can do:  · Talk about it- your feelings and reasons for harming yourself  · Remove any means that you might use to hurt yourself (examples: pills, rope, extension cords, firearm)  · Get professional help from  the community (Mental Health, Substance Abuse, psychological counseling)  · Do not be alone:Call your Safe Contact- someone whom you trust who will be there for you.  · Call your local CRISIS HOTLINE 647-6242 or 578-462-2359  · Call your local Children's Mobile Crisis Response Team [place] (357) 143-8081 or www.eGenerations  · Call the toll free National Suicide Prevention Hotlines   · National Suicide Prevention Lifeline 323-439-VWGU (9605)  · National Hope Line Network 800-SUICIDE (491-8942)

## 2020-02-13 ENCOUNTER — OFFICE VISIT (OUTPATIENT)
Dept: DERMATOLOGY | Facility: IMAGING CENTER | Age: 50
End: 2020-02-13
Payer: COMMERCIAL

## 2020-02-13 DIAGNOSIS — L81.4 LENTIGINES: ICD-10-CM

## 2020-02-13 DIAGNOSIS — D22.9 MULTIPLE MELANOCYTIC NEVI: ICD-10-CM

## 2020-02-13 DIAGNOSIS — D18.01 CHERRY ANGIOMA: ICD-10-CM

## 2020-02-13 DIAGNOSIS — D48.5 NEOPLASM OF UNCERTAIN BEHAVIOR OF SKIN: ICD-10-CM

## 2020-02-13 DIAGNOSIS — Z12.83 SKIN CANCER SCREENING: ICD-10-CM

## 2020-02-13 DIAGNOSIS — Z85.828 HISTORY OF NONMELANOMA SKIN CANCER: ICD-10-CM

## 2020-02-13 PROCEDURE — 11102 TANGNTL BX SKIN SINGLE LES: CPT | Performed by: DERMATOLOGY

## 2020-02-13 PROCEDURE — 99214 OFFICE O/P EST MOD 30 MIN: CPT | Mod: 25 | Performed by: DERMATOLOGY

## 2020-02-13 NOTE — PROGRESS NOTES
"DERMATOLOGY NOTE  FOLLOW UP VISIT       Chief complaint: Follow-Up (ROXI )       Aleksey Chavez is a 49 y.o. male who presents for evaluation of multiple skin lesions to check for skin cancer  Denies new, growing, changing, itching or bleeding skin lesions today.    History of skin cancer: Yes, BCC of right lateral neck s/p C&D fall 2017  History of biopsies:Yes, Details: 2008  , shoulder negative   History of blistering/severe sunburns:Yes, Details: 7 months ago , youth, grew up in [place]  Family history of skin cancer:No  Family history of atypical moles:No      Past Medical History:   Diagnosis Date   • Deviated nasal septum 3-28-17   • Hypertrophy of nasal turbinates 3-28-17   • Hypertrophy of tonsils 3-28-17   • Snoring 3-28-17    Has not had a sleep study   • Tuberculosis     Pt. states has a \"yearly CXR at [facility] in [place] which is negative\". \"Last CXR done on \".        History reviewed. No pertinent family history.     Social History     Socioeconomic History   • Marital status:      Spouse name: Not on file   • Number of children: Not on file   • Years of education: Not on file   • Highest education level: Not on file   Occupational History   • Not on file   Social Needs   • Financial resource strain: Not on file   • Food insecurity     Worry: Not on file     Inability: Not on file   • Transportation needs     Medical: Not on file     Non-medical: Not on file   Tobacco Use   • Smoking status: Never Smoker   • Smokeless tobacco: Never Used   Substance and Sexual Activity   • Alcohol use: Yes     Comment: \"3-4-per week\"   • Drug use: No   • Sexual activity: Not on file   Lifestyle   • Physical activity     Days per week: Not on file     Minutes per session: Not on file   • Stress: Not on file   Relationships   • Social connections     Talks on phone: Not on file     Gets together: Not on file     Attends Anabaptist service: Not on file     Active member of club or organization: Not on file    "  Attends meetings of clubs or organizations: Not on file     Relationship status: Not on file   • Intimate partner violence     Fear of current or ex partner: Not on file     Emotionally abused: Not on file     Physically abused: Not on file     Forced sexual activity: Not on file   Other Topics Concern   • Not on file   Social History Narrative   • Not on file        No Known Allergies     MEDICATIONS:  Medications relevant to specialty reviewed.    Current Outpatient Medications:   •  methocarbamol (ROBAXIN) 750 MG Tab, Take 1 Tab by mouth 3 times a day as needed., Disp: 90 Tab, Rfl: 0     REVIEW OF SYSTEMS:   Positive for skin (see HPI)  Negative for fevers and chills  All other systems reviewed and are negative.     EXAM:  There were no vitals taken for this visit.  Constitutional: Well-developed, well-nourished, and in no distress.   HENT:   Head: normocephalic and atraumatic.  Right Ear: External ear normal.   Left Ear: External ear normal.   Nose: Nose normal.   Mouth/Throat: Oropharynx is clear and moist.   Eyes: Conjunctivae and lids are normal.   Neck: Normal range of motion. Neck supple.   Pulmonary/Chest: Effort normal.   Neurological: Alert and oriented.    A total body skin exam was performed including the scalp, hair, face, eyelids, nose, lips, oral cavity, ears, neck, back, buttocks, chest, abdomen, bilateral upper and lower extremities including hands, feet, digits and nails, excluding genital exam (declined by patient). Notable findings on exam today listed below. Remaining above-listed examined areas within normal limits / negative for rashes or lesions.     Right neck with well healed scar(s) and no evidence of recurrence on inspection or palpation  -trunk and extremities with scattered light and medium brown uniform macules and papules all of which were morphologically similar and with benign-appearing pigment network patterns on dermoscopy   -trunk and extremities with cherry red macules and  "papules  -sun exposed skin of trunk and b/l upper and lower extremities with scattered clinically benign light brown reticulated macules all of which were morphologically similar and none of which were suspicious for skin cancer today on exam  Left abdomen  with 6mm two toned pink and brown macule with atypical pigment network on dermoscopy     IMPRESSION / PLAN:    1. Neoplasm of uncertain behavior of skin  - Discussed monitoring vs biopsy, patient agreeable to biopsy today, see procedure note below  - Biopsy Procedure Note: biopsy by shave technique  - Location: A. Right abdomen  - Size: as noted in exam  - Total specimens sent for pathology: 1  - Photo taken with patient permission (see media tab)  - Preoperative diagnosis: r/o atypia    Shave bx x1   Risks, benefits and alternatives of procedure discussed, verbal consent obtained for photo and written informed consent obtained for procedure. Time out completed. Area of biopsy prepped with alcohol. Anesthesia with 1% lidocaine with epinephrine administered with 30 gauge needle. Shave biopsy of the site performed. Hemostasis achieved with aluminum chloride. Vaseline applied to wound with bandage. Patient tolerated procedure well and there were no complications. The specimen was sent to the pathology lab by the staff. Wound care was discussed.      2. Multiple melanocytic nevi  -Reviewed moles and melanoma. Patient advised to return sooner than scheduled if concerning changes in moles are noted.  -Reviewed sun protective behavior including sunscreen application and reapplication, appropriate choice of SPF, hat, protective clothing, seeking shade and never choosing to \"lie out\" in the sun.    3. Lentigines  - Discussed benign nature of lesions, related to sun exposure  - Discussed sun protection    4. Cherry angioma  -nature of the diagnosis was discussed in detail  -clinically benign today on exam, reassurance was given  -continue to monitor for any changes, pt to " call if any changes occur    5. History of nonmelanoma skin cancer  -no evidence of recurrence of lesion(s) on exam today   -patient to call and return to clinic sooner than next scheduled dermatology visit if new/changing/otherwise concerning skin lesions are noted  -recommend diligent photoprotection and regular TSEs    6. Skin cancer screening  Skin cancer education  - discussed importance of sun protective clothing, eyewear  - discussed importance of daily use of broad spectrum sunscreen with SPF 30 or greater, as well as need for reapplication ~every 2 hours when exposed to UVR  - discussed importance of regular self-exams, ideally once per month, every 12 months exams in clinic  - ABCDE's of melanoma discussed  - patient to bring any new or concerning lesions to my attention     Return to clinic in: Return in about 1 year (around 2/13/2021) for ROXI, sooner pending path. and as needed for any new or changing skin lesions.    Katy Urbina M.D.

## 2020-02-18 ENCOUNTER — TELEPHONE (OUTPATIENT)
Dept: DERMATOLOGY | Facility: IMAGING CENTER | Age: 50
End: 2020-02-18

## (undated) DEVICE — GOWN WARMING STANDARD FLEX - (30/CA)

## (undated) DEVICE — SET LEADWIRE 5 LEAD BEDSIDE DISPOSABLE ECG (1SET OF 5/EA)

## (undated) DEVICE — SET EXTENSION WITH 2 PORTS (48EA/CA) ***PART #2C8610 IS A SUBSTITUTE*****

## (undated) DEVICE — SUCTION INSTRUMENT YANKAUER BULBOUS TIP W/O VENT (50EA/CA)

## (undated) DEVICE — ELECTRODE 850 FOAM ADHESIVE - HYDROGEL RADIOTRNSPRNT (50/PK)

## (undated) DEVICE — GAUZE FLUFF STERILE 2-PLY 36 X 36 (100EA/CA)

## (undated) DEVICE — LACTATED RINGERS INJ 1000 ML - (14EA/CA 60CA/PF)

## (undated) DEVICE — ARMREST CRADLE FOAM - (2PR/PK 12PR/CA)

## (undated) DEVICE — LEAD SET 6 DISP. EKG NIHON KOHDEN

## (undated) DEVICE — SENSOR SPO2 NEO LNCS ADHESIVE (20/BX) SEE USER NOTES

## (undated) DEVICE — TUBE E-T HI-LO CUFF 7.0MM (10EA/PK)

## (undated) DEVICE — JELLY, KY 2 0Z STERILE

## (undated) DEVICE — KIT ROOM DECONTAMINATION

## (undated) DEVICE — SUTURE GENERAL

## (undated) DEVICE — Device

## (undated) DEVICE — MASK ANESTHESIA ADULT  - (100/CA)

## (undated) DEVICE — SUTURE 3-0 ETHILON FS-1 - (36/BX) 30 INCH

## (undated) DEVICE — HEAD HOLDER JUNIOR/ADULT

## (undated) DEVICE — GLOVE BIOGEL SZ 7.5 SURGICAL PF LTX - (50PR/BX 4BX/CA)

## (undated) DEVICE — ELECTRODE DUAL RETURN W/ CORD - (50/PK)

## (undated) DEVICE — BOVIE FOOT CONTROL SUCTION - 6IN 10FR (25EA/CA)

## (undated) DEVICE — BRIEF STRETCH MATERNITY M/L - FITS 20-60IN (5EA/BG 20BG/CA)

## (undated) DEVICE — KIT ANESTHESIA W/CIRCUIT & 3/LT BAG W/FILTER (20EA/CA)

## (undated) DEVICE — GLOVE COTTON STERILE (50/CA)

## (undated) DEVICE — SODIUM CHL IRRIGATION 0.9% 1000ML (12EA/CA)

## (undated) DEVICE — CHLORAPREP 26 ML APPLICATOR - ORANGE TINT(25/CA)

## (undated) DEVICE — CANISTER SUCTION 3000ML MECHANICAL FILTER AUTO SHUTOFF MEDI-VAC NONSTERILE LF DISP  (40EA/CA)

## (undated) DEVICE — PATTIES SURG X-RAYCOTTONOID - 1/2 X 3 IN (200/CA)

## (undated) DEVICE — SUTURE 2-0 CHROMIC GUT SH 27 (36PK/BX)"

## (undated) DEVICE — NEPTUNE 4 PORT MANIFOLD - (20/PK)

## (undated) DEVICE — PACK MINOR BASIN - (2EA/CA)

## (undated) DEVICE — TUBING CLEARLINK DUO-VENT - C-FLO (48EA/CA)

## (undated) DEVICE — SLEEVE, VASO, THIGH, MED

## (undated) DEVICE — PROTECTOR ULNA NERVE - (36PR/CA)

## (undated) DEVICE — SUTURE 1 VICRYL PLUS CT-1 - 18 INCH (12/BX)

## (undated) DEVICE — CATHETER IV 20 GA X 1-1/4 ---SURG.& SDS ONLY--- (50EA/BX)

## (undated) DEVICE — ANTI-FOG SOLUTION - 60BTL/CA

## (undated) DEVICE — KIT EVACUATER 3 SPRING PVC LF 1/8 DRAIN SIZE (10EA/CA)"

## (undated) DEVICE — MIDAS LUBRICATOR DIFFUSER PACK (4EA/CA)

## (undated) DEVICE — GLOVE, BIOGEL ECLIPSE, SZ 7.0, PF LTX (50/BX)

## (undated) DEVICE — GLOVE SZ 7 BIOGEL PI MICRO - PF LF (50PR/BX 4BX/CA)

## (undated) DEVICE — PENCIL ELECTSURG 10FT BTN SWH - (50/CA)

## (undated) DEVICE — LACTATED RINGERS INJ. 500 ML - (24EA/CA)

## (undated) DEVICE — SEALER BIPOLAR 2.3 AQUAMANTYS

## (undated) DEVICE — GLOVE BIOGEL SZ 8 SURGICAL PF LTX - (50PR/BX 4BX/CA)

## (undated) DEVICE — TUBE SHILEY ENDOTRACHEAL ORAL RAE CUFFED 8.0MM WITH TAPERGUARD (10EA/BX)

## (undated) DEVICE — SUTURE 3-0 VICRYL PLUS RB-1 - 8 X 18 INCH (12/BX)

## (undated) DEVICE — PACK NEURO - (2EA/CA)

## (undated) DEVICE — DRAPE STRLE REG TOWEL 18X24 - (10/BX 4BX/CA)"

## (undated) DEVICE — TUBE CONNECTING SUCTION - CLEAR PLASTIC STERILE 72 IN (50EA/CA)

## (undated) DEVICE — SUTURE 4-0 VICRYL PLUS P-3 18 (12PK/BX)"

## (undated) DEVICE — TOOL DISSECT MATCH HEAD

## (undated) DEVICE — DEVICE MONOPOLAR RF PEAK PLASMABLADE 3.0S

## (undated) DEVICE — DERMABOND ADVANCED - (12EA/BX)

## (undated) DEVICE — GLOVE BIOGEL INDICATOR SZ 8 SURGICAL PF LTX - (50/BX 4BX/CA)

## (undated) DEVICE — DRAPE LAPAROTOMY T SHEET - (12EA/CA)

## (undated) DEVICE — TRAY SRGPRP PVP IOD WT PRP - (20/CA)

## (undated) DEVICE — SUTURE 2-0 VICRYL PLUS CT-1 - 8 X 18 INCH(12/BX)

## (undated) DEVICE — DRAPE SURG STERI-DRAPE 7X11OD - (40EA/CA)

## (undated) DEVICE — TUBING C&T SET FLYING LEADS DRAIN TUBING (10EA/BX)

## (undated) DEVICE — SYRINGE DISP. 12 CC LL - (100/BX)

## (undated) DEVICE — KIT  I.V. START (100EA/CA)

## (undated) DEVICE — BLADE SURGICAL CLIPPER - (50EA/CA)

## (undated) DEVICE — PACK ENT OR - (2EA/CA)

## (undated) DEVICE — HEADREST PRONEVIEW LARGE - (10/CA)

## (undated) DEVICE — BOVIE BLADE COATED &INSULATED - 25/PK

## (undated) DEVICE — KIT SURGIFLO W/OUT THROMBIN - (6EA/CA)